# Patient Record
Sex: MALE | Race: WHITE | NOT HISPANIC OR LATINO | URBAN - METROPOLITAN AREA
[De-identification: names, ages, dates, MRNs, and addresses within clinical notes are randomized per-mention and may not be internally consistent; named-entity substitution may affect disease eponyms.]

---

## 2023-03-10 DIAGNOSIS — I10 HYPERTENSION, UNSPECIFIED TYPE: ICD-10-CM

## 2023-03-10 DIAGNOSIS — F98.8 ATTENTION DEFICIT DISORDER, UNSPECIFIED HYPERACTIVITY PRESENCE: ICD-10-CM

## 2023-03-10 RX ORDER — DEXTROAMPHETAMINE SACCHARATE, AMPHETAMINE ASPARTATE, DEXTROAMPHETAMINE SULFATE AND AMPHETAMINE SULFATE 3.75; 3.75; 3.75; 3.75 MG/1; MG/1; MG/1; MG/1
15 TABLET ORAL 2 TIMES DAILY
Qty: 60 TABLET | Refills: 0 | Status: SHIPPED | OUTPATIENT
Start: 2023-03-10 | End: 2023-04-18 | Stop reason: SDUPTHER

## 2023-03-10 RX ORDER — OLMESARTAN MEDOXOMIL 20 MG/1
20 TABLET ORAL DAILY
COMMUNITY
End: 2023-05-23 | Stop reason: SDUPTHER

## 2023-03-10 RX ORDER — DEXTROAMPHETAMINE SACCHARATE, AMPHETAMINE ASPARTATE, DEXTROAMPHETAMINE SULFATE AND AMPHETAMINE SULFATE 3.75; 3.75; 3.75; 3.75 MG/1; MG/1; MG/1; MG/1
15 TABLET ORAL DAILY
COMMUNITY
End: 2023-03-10 | Stop reason: SDUPTHER

## 2023-03-10 NOTE — TELEPHONE ENCOUNTER
Pt wants second and third refill of adderall sent to [facility]   Also needs his bp med sent there as well

## 2023-03-12 RX ORDER — DEXTROAMPHETAMINE SACCHARATE, AMPHETAMINE ASPARTATE, DEXTROAMPHETAMINE SULFATE AND AMPHETAMINE SULFATE 3.75; 3.75; 3.75; 3.75 MG/1; MG/1; MG/1; MG/1
15 TABLET ORAL 2 TIMES DAILY
Qty: 60 TABLET | Refills: 0 | OUTPATIENT
Start: 2023-03-12 | End: 2023-04-11

## 2023-03-12 RX ORDER — OLMESARTAN MEDOXOMIL 20 MG/1
20 TABLET ORAL DAILY
Qty: 60 TABLET | Refills: 0 | OUTPATIENT
Start: 2023-03-12

## 2023-03-12 NOTE — TELEPHONE ENCOUNTER
I already refilled his Adderall just a few days ago    Also, he should have gotten a 90-day prescription of his olmesartan that was sent 1 month ago meaning that he should have 60 more days left    Can you please look into this further before I send this medication and again    This message was originally sent by russell

## 2023-04-18 DIAGNOSIS — F98.8 ATTENTION DEFICIT DISORDER, UNSPECIFIED HYPERACTIVITY PRESENCE: ICD-10-CM

## 2023-04-18 RX ORDER — DEXTROAMPHETAMINE SACCHARATE, AMPHETAMINE ASPARTATE, DEXTROAMPHETAMINE SULFATE AND AMPHETAMINE SULFATE 3.75; 3.75; 3.75; 3.75 MG/1; MG/1; MG/1; MG/1
15 TABLET ORAL 2 TIMES DAILY
Qty: 60 TABLET | Refills: 0 | Status: SHIPPED | OUTPATIENT
Start: 2023-04-18 | End: 2023-05-23 | Stop reason: SDUPTHER

## 2023-05-23 ENCOUNTER — TELEMEDICINE (OUTPATIENT)
Dept: PRIMARY CARE | Facility: CLINIC | Age: 43
End: 2023-05-23
Payer: COMMERCIAL

## 2023-05-23 DIAGNOSIS — F98.8 ATTENTION DEFICIT DISORDER, UNSPECIFIED HYPERACTIVITY PRESENCE: Primary | ICD-10-CM

## 2023-05-23 DIAGNOSIS — R03.0 ELEVATED BLOOD PRESSURE READING: ICD-10-CM

## 2023-05-23 PROBLEM — F41.9 ANXIETY: Status: ACTIVE | Noted: 2023-05-23

## 2023-05-23 PROBLEM — M10.9 ACUTE GOUT INVOLVING TOE: Status: ACTIVE | Noted: 2023-05-23

## 2023-05-23 PROBLEM — E78.5 HYPERLIPIDEMIA: Status: ACTIVE | Noted: 2023-05-23

## 2023-05-23 PROCEDURE — 99214 OFFICE O/P EST MOD 30 MIN: CPT | Performed by: STUDENT IN AN ORGANIZED HEALTH CARE EDUCATION/TRAINING PROGRAM

## 2023-05-23 RX ORDER — DEXTROAMPHETAMINE SACCHARATE, AMPHETAMINE ASPARTATE, DEXTROAMPHETAMINE SULFATE AND AMPHETAMINE SULFATE 3.75; 3.75; 3.75; 3.75 MG/1; MG/1; MG/1; MG/1
15 TABLET ORAL 2 TIMES DAILY
Qty: 60 TABLET | Refills: 0 | Status: SHIPPED | OUTPATIENT
Start: 2023-05-23 | End: 2023-07-03 | Stop reason: SDUPTHER

## 2023-05-23 RX ORDER — OLMESARTAN MEDOXOMIL 20 MG/1
20 TABLET ORAL DAILY
Qty: 90 TABLET | Refills: 0 | Status: SHIPPED | OUTPATIENT
Start: 2023-05-23 | End: 2023-07-03 | Stop reason: SDUPTHER

## 2023-05-23 NOTE — PROGRESS NOTES
Subjective   Patient ID: Iva Harris is a 42 y.o. male who presents for ADD.  Today he is accompanied by alone.     HPI  I have personally reviewed the OARRS report for IVA HARRIS. I have considered the risks of abuse, dependence, addiction and diversion.   Is the patient prescribed a combination of a benzodiazepine and opioid? No.   Last urine drug screening date/ordered today: 02/02/2023   Date of the last Controlled Substance Agreement: 02/02/2023      Patient is presenting today by a virtual visit doing well overall.     1. ADD  Continues to take Adderall 15 mg twice daily as indicated the chart without any issues/complaints  Currently satisfied with regimen.  Asking for refills to be sent to his local pharmacy which is now down in [place], [place]  Denies any side effect from medication     2. Elevated blood pressure reading  Continues to take olmesartan 20mg as of last visit.  Blood pressure was elevated at 154/88 as of last visit.  Denies any cardiopulmonary symptoms  States that his blood pressures are better controlled at home and he feels well      Current Outpatient Medications on File Prior to Visit   Medication Sig Dispense Refill    amphetamine-dextroamphetamine (Adderall) 15 mg tablet Take 1 tablet (15 mg) by mouth in the morning and 1 tablet (15 mg) before bedtime. 60 tablet 0    olmesartan (BENIcar) 20 mg tablet Take 1 tablet (20 mg) by mouth once daily.       No current facility-administered medications on file prior to visit.        No Known Allergies    Immunization History   Administered Date(s) Administered    Tdap 06/08/2021         Review of Systems  All pertinent positive symptoms are included in the history of present illness.  All other systems have been reviewed and are negative and noncontributory to this patient's current ailments.     Objective   There were no vitals taken for this visit.  BSA: There is no height or weight on file to calculate BSA.  No visits with results within 1  Month(s) from this visit.   Latest known visit with results is:   Legacy Encounter on 02/02/2023   Component Date Value Ref Range Status    DRUG SCREEN COMMENT URINE 02/02/2023 SEE BELOW   Final    Comment: Drug screen results are presumptive and should not be used to assess   compliance with prescribed medication. Definitive confirmatory drug testing   has been added to this sample for any positive screen result and will be   reported separately.   .  Toxicology screening results are reported qualitatively. The concentration   must be greater than or equal to the cutoff to be reported as positive. The   concentration at which the screening test can detect an individual drug or   metabolite varies. The absence of expected drug(s) and/or drug metabolite(s)   may indicate non-compliance, inappropriate timing of specimen collection   relative to drug administration, poor drug absorption, diluted/adulterated   urine, or limitations of testing. For medical purposes only; not valid for   forensic use.   .  Interpretive questions should be directed to the laboratory medical   directors.        Amphetamine Screen, Urine 02/02/2023 PRESUMPTIVE POSITIVE (A)  NEGATIVE Final    Comment:  CUTOFF LEVEL:  500 NG/ML   Cross-reactivity has been reported with high concentrations   of the following drugs: buproprion, chloroquine, chlorpromazine,   ephedrine, mephentermine, fenfluramine, phentermine,   phenylpropanolamine, pseudoephedrine, and propranolol.      Barbiturate Screen, Urine 02/02/2023 PRESUMPTIVE NEGATIVE  NEGATIVE Final     CUTOFF LEVEL: 200 NG/ML    BENZODIAZEPINE (PRESENCE) IN URINE* 02/02/2023 PRESUMPTIVE NEGATIVE  NEGATIVE Final     CUTOFF LEVEL: 200 NG/ML    Cannabinoid Screen, Urine 02/02/2023 PRESUMPTIVE NEGATIVE  NEGATIVE Final     CUTOFF LEVEL: 50 NG/ML    Cocaine Screen, Urine 02/02/2023 PRESUMPTIVE NEGATIVE  NEGATIVE Final     CUTOFF LEVEL: 150 NG/ML    Fentanyl, Ur 02/02/2023 PRESUMPTIVE NEGATIVE  NEGATIVE  Final      CUTOFF LEVEL:  5 NG/ML    Methadone Screen, Urine 02/02/2023 PRESUMPTIVE NEGATIVE  NEGATIVE Final    Comment:  CUTOFF LEVEL: 150 NG/ML   The metabolite L-alpha-acetylmethadol (LAAM) is not   detected by this method in concentrations that would   be found in the urine of patients on LAAM therapy.      Opiate Screen, Urine 02/02/2023 PRESUMPTIVE NEGATIVE  NEGATIVE Final    Comment:  CUTOFF LEVEL: 300 NG/ML   The opiate screen does not detect fentanyl, meperidine, or    tramadol. Oxycodone is not consistently detected (refer to   Oxycodone Screen, Urine result).      Oxycodone Screen, Ur 02/02/2023 PRESUMPTIVE NEGATIVE  NEGATIVE Final    Comment:  CUTOFF LEVEL: 100 NG/ML    This test will accurately detect both oxycodone and oxymorphone.           PCP Screen, Urine 02/02/2023 PRESUMPTIVE NEGATIVE  NEGATIVE Final    Comment:  CUTOFF LEVEL:  25 NG/ML   Cross-reactivity has been reported with dextromethorphan.      Amphetamines,Urine 02/02/2023 1,035  ng/mL Final    Comment: Consistent with use of a drug containing amphetamine. May also   reflect metabolism of methamphetamine, when methamphetamine is   present.  Amphetamine and methamphetamine exist in d- and   l-isomeric forms.  These forms are not distinguished by this test.    Isomeric separation is available separately for an additional   charge.  INTERPRETIVE INFORMATION: Amphetamines, Urine,                             Quantitative  Methodology: Quantitative Liquid Chromatography-Tandem Mass   Spectrometry  Positive cutoff: 200 ng/mL unless specified below:  Amphetamine     50 ng/mL  For medical purposes only; not valid for forensic use.   The absence of expected drug(s) and/or drug metabolite(s) may   indicate non-compliance, inappropriate timing of specimen   collection relative to drug administration, poor drug absorption,   diluted/adulterated urine, or limitations of testing. The   concentration value must be greater than or equal to the cutoff to    be reported as positive. Interpretive question                           s should be directed   to the laboratory.  This test was developed and its performance characteristics   determined by [facility]. It has not been cleared or   approved by the US Food and Drug Administration. This test was   performed in a CLIA certified laboratory and is intended for   clinical purposes.      MDA, Urine 02/02/2023 <200  ng/mL Final    MDEA, Urine 02/02/2023 <200  ng/mL Final    MDMA, Urine 02/02/2023 <200  ng/mL Final    Methamphetamine Quant, Ur 02/02/2023 <200  ng/mL Final    Phentermine,Urine 02/02/2023 <200  ng/mL Final    Comment: Performed By: [facility]  [address] [place] [address]  : Jonathan Ma MD, PhD         Physical Exam  CONSTITUTIONAL - well nourished, well developed, looks like stated age, in no acute distress, not ill-appearing, and not tired appearing  SKIN - normal skin color and pigmentation, normal skin turgor without rash, lesions, or nodules visualized  HEAD - no trauma, normocephalic  EYES - normal external exam  NECK - supple without rigidity, no neck mass was observed, no thyromegaly or thyroid nodules  CHEST - clear to auscultation, no wheezing, no crackles and no rales, good effort  EXTREMITIES - no edema, no deformities  PSYCHIATRIC - alert, pleasant and cordial, age-appropriate  IMMUNOLOGIC - no cervical lymphadenopathy     Assessment/Plan   1. ADD  Stable. No changes recommended at this time  We will continue your Adderall 15 mg twice daily as currently prescribed  We are only able to provide 1 month refill due to this being virtual  I would recommend that you come back within the next month to get a 3-month prescription     2. Elevated blood pressure reading  Refilled olmesartan 20 mg daily  I would like to have you monitor and record blood pressures at home   Blood pressure goal should be below 130/80, ideally 120/80      Please follow-up  within the next month for yearly physical examination as well as further refills

## 2023-07-03 ENCOUNTER — LAB (OUTPATIENT)
Dept: LAB | Facility: LAB | Age: 43
End: 2023-07-03
Payer: COMMERCIAL

## 2023-07-03 ENCOUNTER — OFFICE VISIT (OUTPATIENT)
Dept: PRIMARY CARE | Facility: CLINIC | Age: 43
End: 2023-07-03
Payer: COMMERCIAL

## 2023-07-03 VITALS
HEIGHT: 72 IN | WEIGHT: 197 LBS | DIASTOLIC BLOOD PRESSURE: 100 MMHG | HEART RATE: 97 BPM | SYSTOLIC BLOOD PRESSURE: 150 MMHG | BODY MASS INDEX: 26.68 KG/M2

## 2023-07-03 DIAGNOSIS — R03.0 ELEVATED BLOOD PRESSURE READING: ICD-10-CM

## 2023-07-03 DIAGNOSIS — Z00.00 HEALTHCARE MAINTENANCE: Primary | ICD-10-CM

## 2023-07-03 DIAGNOSIS — H61.22 IMPACTED CERUMEN OF LEFT EAR: ICD-10-CM

## 2023-07-03 DIAGNOSIS — F98.8 ATTENTION DEFICIT DISORDER, UNSPECIFIED HYPERACTIVITY PRESENCE: ICD-10-CM

## 2023-07-03 LAB
ALANINE AMINOTRANSFERASE (SGPT) (U/L) IN SER/PLAS: 31 U/L (ref 10–52)
ALBUMIN (G/DL) IN SER/PLAS: 4.2 G/DL (ref 3.4–5)
ALKALINE PHOSPHATASE (U/L) IN SER/PLAS: 63 U/L (ref 33–120)
ANION GAP IN SER/PLAS: 13 MMOL/L (ref 10–20)
ASPARTATE AMINOTRANSFERASE (SGOT) (U/L) IN SER/PLAS: 21 U/L (ref 9–39)
BASOPHILS (10*3/UL) IN BLOOD BY AUTOMATED COUNT: 0.03 X10E9/L (ref 0–0.1)
BASOPHILS/100 LEUKOCYTES IN BLOOD BY AUTOMATED COUNT: 0.7 % (ref 0–2)
BILIRUBIN TOTAL (MG/DL) IN SER/PLAS: 0.9 MG/DL (ref 0–1.2)
CALCIUM (MG/DL) IN SER/PLAS: 9.5 MG/DL (ref 8.6–10.3)
CARBON DIOXIDE, TOTAL (MMOL/L) IN SER/PLAS: 27 MMOL/L (ref 21–32)
CHLORIDE (MMOL/L) IN SER/PLAS: 102 MMOL/L (ref 98–107)
CHOLESTEROL (MG/DL) IN SER/PLAS: 233 MG/DL (ref 0–199)
CHOLESTEROL IN HDL (MG/DL) IN SER/PLAS: 102.1 MG/DL
CHOLESTEROL/HDL RATIO: 2.3
CREATININE (MG/DL) IN SER/PLAS: 0.92 MG/DL (ref 0.5–1.3)
EOSINOPHILS (10*3/UL) IN BLOOD BY AUTOMATED COUNT: 0.04 X10E9/L (ref 0–0.7)
EOSINOPHILS/100 LEUKOCYTES IN BLOOD BY AUTOMATED COUNT: 0.9 % (ref 0–6)
ERYTHROCYTE DISTRIBUTION WIDTH (RATIO) BY AUTOMATED COUNT: 14.2 % (ref 11.5–14.5)
ERYTHROCYTE MEAN CORPUSCULAR HEMOGLOBIN CONCENTRATION (G/DL) BY AUTOMATED: 32.6 G/DL (ref 32–36)
ERYTHROCYTE MEAN CORPUSCULAR VOLUME (FL) BY AUTOMATED COUNT: 94 FL (ref 80–100)
ERYTHROCYTES (10*6/UL) IN BLOOD BY AUTOMATED COUNT: 5.17 X10E12/L (ref 4.5–5.9)
ESTIMATED AVERAGE GLUCOSE FOR HBA1C: 123 MG/DL
GFR MALE: >90 ML/MIN/1.73M2
GLUCOSE (MG/DL) IN SER/PLAS: 133 MG/DL (ref 74–99)
HEMATOCRIT (%) IN BLOOD BY AUTOMATED COUNT: 48.8 % (ref 41–52)
HEMOGLOBIN (G/DL) IN BLOOD: 15.9 G/DL (ref 13.5–17.5)
HEMOGLOBIN A1C/HEMOGLOBIN TOTAL IN BLOOD: 5.9 %
IMMATURE GRANULOCYTES/100 LEUKOCYTES IN BLOOD BY AUTOMATED COUNT: 0.2 % (ref 0–0.9)
LDL: 117 MG/DL (ref 0–99)
LEUKOCYTES (10*3/UL) IN BLOOD BY AUTOMATED COUNT: 4.6 X10E9/L (ref 4.4–11.3)
LYMPHOCYTES (10*3/UL) IN BLOOD BY AUTOMATED COUNT: 0.9 X10E9/L (ref 1.2–4.8)
LYMPHOCYTES/100 LEUKOCYTES IN BLOOD BY AUTOMATED COUNT: 19.7 % (ref 13–44)
MONOCYTES (10*3/UL) IN BLOOD BY AUTOMATED COUNT: 0.5 X10E9/L (ref 0.1–1)
MONOCYTES/100 LEUKOCYTES IN BLOOD BY AUTOMATED COUNT: 10.9 % (ref 2–10)
NEUTROPHILS (10*3/UL) IN BLOOD BY AUTOMATED COUNT: 3.09 X10E9/L (ref 1.2–7.7)
NEUTROPHILS/100 LEUKOCYTES IN BLOOD BY AUTOMATED COUNT: 67.6 % (ref 40–80)
PLATELETS (10*3/UL) IN BLOOD AUTOMATED COUNT: 205 X10E9/L (ref 150–450)
POC APPEARANCE, URINE: CLEAR
POC BILIRUBIN, URINE: NEGATIVE
POC BLOOD, URINE: NEGATIVE
POC COLOR, URINE: ABNORMAL
POC GLUCOSE, URINE: NEGATIVE MG/DL
POC KETONES, URINE: NEGATIVE MG/DL
POC LEUKOCYTES, URINE: NEGATIVE
POC NITRITE,URINE: NEGATIVE
POC PH, URINE: 6.5 PH
POC PROTEIN, URINE: NEGATIVE MG/DL
POC SPECIFIC GRAVITY, URINE: 1.01
POC UROBILINOGEN, URINE: 0.2 EU/DL
POTASSIUM (MMOL/L) IN SER/PLAS: 3.7 MMOL/L (ref 3.5–5.3)
PROSTATE SPECIFIC AG (NG/ML) IN SER/PLAS: 2.43 NG/ML (ref 0–4)
PROTEIN TOTAL: 7.2 G/DL (ref 6.4–8.2)
SODIUM (MMOL/L) IN SER/PLAS: 138 MMOL/L (ref 136–145)
THYROTROPIN (MIU/L) IN SER/PLAS BY DETECTION LIMIT <= 0.05 MIU/L: 1.8 MIU/L (ref 0.44–3.98)
TRIGLYCERIDE (MG/DL) IN SER/PLAS: 68 MG/DL (ref 0–149)
UREA NITROGEN (MG/DL) IN SER/PLAS: 14 MG/DL (ref 6–23)
VLDL: 14 MG/DL (ref 0–40)

## 2023-07-03 PROCEDURE — 85025 COMPLETE CBC W/AUTO DIFF WBC: CPT

## 2023-07-03 PROCEDURE — 81002 URINALYSIS NONAUTO W/O SCOPE: CPT | Performed by: STUDENT IN AN ORGANIZED HEALTH CARE EDUCATION/TRAINING PROGRAM

## 2023-07-03 PROCEDURE — 80053 COMPREHEN METABOLIC PANEL: CPT

## 2023-07-03 PROCEDURE — 84443 ASSAY THYROID STIM HORMONE: CPT

## 2023-07-03 PROCEDURE — 93000 ELECTROCARDIOGRAM COMPLETE: CPT | Performed by: STUDENT IN AN ORGANIZED HEALTH CARE EDUCATION/TRAINING PROGRAM

## 2023-07-03 PROCEDURE — 84153 ASSAY OF PSA TOTAL: CPT

## 2023-07-03 PROCEDURE — 99214 OFFICE O/P EST MOD 30 MIN: CPT | Performed by: STUDENT IN AN ORGANIZED HEALTH CARE EDUCATION/TRAINING PROGRAM

## 2023-07-03 PROCEDURE — 36415 COLL VENOUS BLD VENIPUNCTURE: CPT

## 2023-07-03 PROCEDURE — 83036 HEMOGLOBIN GLYCOSYLATED A1C: CPT

## 2023-07-03 PROCEDURE — 99396 PREV VISIT EST AGE 40-64: CPT | Performed by: STUDENT IN AN ORGANIZED HEALTH CARE EDUCATION/TRAINING PROGRAM

## 2023-07-03 PROCEDURE — 80061 LIPID PANEL: CPT

## 2023-07-03 RX ORDER — DEXTROAMPHETAMINE SACCHARATE, AMPHETAMINE ASPARTATE, DEXTROAMPHETAMINE SULFATE AND AMPHETAMINE SULFATE 3.75; 3.75; 3.75; 3.75 MG/1; MG/1; MG/1; MG/1
15 TABLET ORAL 2 TIMES DAILY
Qty: 60 TABLET | Refills: 0 | Status: SHIPPED | OUTPATIENT
Start: 2023-09-01 | End: 2023-11-03 | Stop reason: SDUPTHER

## 2023-07-03 RX ORDER — OLMESARTAN MEDOXOMIL 40 MG/1
40 TABLET ORAL DAILY
Qty: 90 TABLET | Refills: 1 | Status: SHIPPED | OUTPATIENT
Start: 2023-07-03 | End: 2023-11-03 | Stop reason: SDUPTHER

## 2023-07-03 RX ORDER — DEXTROAMPHETAMINE SACCHARATE, AMPHETAMINE ASPARTATE, DEXTROAMPHETAMINE SULFATE AND AMPHETAMINE SULFATE 3.75; 3.75; 3.75; 3.75 MG/1; MG/1; MG/1; MG/1
15 TABLET ORAL 2 TIMES DAILY
Qty: 60 TABLET | Refills: 0 | Status: SHIPPED | OUTPATIENT
Start: 2023-07-03 | End: 2023-10-05 | Stop reason: SDUPTHER

## 2023-07-03 RX ORDER — DEXTROAMPHETAMINE SACCHARATE, AMPHETAMINE ASPARTATE, DEXTROAMPHETAMINE SULFATE AND AMPHETAMINE SULFATE 3.75; 3.75; 3.75; 3.75 MG/1; MG/1; MG/1; MG/1
15 TABLET ORAL 2 TIMES DAILY
Qty: 60 TABLET | Refills: 0 | Status: SHIPPED | OUTPATIENT
Start: 2023-08-02 | End: 2023-11-03 | Stop reason: SDUPTHER

## 2023-07-03 NOTE — PROGRESS NOTES
Subjective   Patient ID: Esequiel Gonzalez is a 43 y.o. male who presents for Annual Exam.  Today he is accompanied by alone.     HPI  1.HM  Overall patient is doing well.   Immunization: Tdap UTD 2021  Colon Cancer Screening: No family history  Diet: clean eating  Exercise: 12k steps a day. Stopped lifting for a few months   Tobacco: chews  EtOH: Rarely Socially - homemade wine    2. ADHD  Continues to take Adderall 15 mg twice daily as needed in the chart  UDS/CSA up-to-date from February 2023  Requests 3mo refill  Denies side effects    3. Hypertension  High today.   States stress 2/2 divorce.   Has been taking medication without any side effects.  Denies any cardiopulmonary symptoms  Requesting and or even willing to increase medication if needed as he admits that he is not fully compliant      Current Outpatient Medications on File Prior to Visit   Medication Sig Dispense Refill    [DISCONTINUED] amphetamine-dextroamphetamine (Adderall) 15 mg tablet Take 1 tablet (15 mg) by mouth 2 times a day. 60 tablet 0    [DISCONTINUED] olmesartan (BENIcar) 20 mg tablet Take 1 tablet (20 mg) by mouth once daily. 90 tablet 0     No current facility-administered medications on file prior to visit.        No Known Allergies    Immunization History   Administered Date(s) Administered    Tdap 05/02/2014, 06/08/2021         Review of Systems  All pertinent positive symptoms are included in the history of present illness.  All other systems have been reviewed and are negative and noncontributory to this patient's current ailments.     Objective   BP (!) 150/100 (BP Location: Left arm, Patient Position: Lying, BP Cuff Size: Adult)   Pulse 97   Ht 1.829 m (6')   Wt 89.4 kg (197 lb)   BMI 26.72 kg/m²   BSA: 2.13 meters squared  No visits with results within 1 Month(s) from this visit.   Latest known visit with results is:   Legacy Encounter on 02/02/2023   Component Date Value Ref Range Status    DRUG SCREEN COMMENT URINE 02/02/2023  SEE BELOW   Final    Comment: Drug screen results are presumptive and should not be used to assess   compliance with prescribed medication. Definitive confirmatory drug testing   has been added to this sample for any positive screen result and will be   reported separately.   .  Toxicology screening results are reported qualitatively. The concentration   must be greater than or equal to the cutoff to be reported as positive. The   concentration at which the screening test can detect an individual drug or   metabolite varies. The absence of expected drug(s) and/or drug metabolite(s)   may indicate non-compliance, inappropriate timing of specimen collection   relative to drug administration, poor drug absorption, diluted/adulterated   urine, or limitations of testing. For medical purposes only; not valid for   forensic use.   .  Interpretive questions should be directed to the laboratory medical   directors.        Amphetamine Screen, Urine 02/02/2023 PRESUMPTIVE POSITIVE (A)  NEGATIVE Final    Comment:  CUTOFF LEVEL:  500 NG/ML   Cross-reactivity has been reported with high concentrations   of the following drugs: buproprion, chloroquine, chlorpromazine,   ephedrine, mephentermine, fenfluramine, phentermine,   phenylpropanolamine, pseudoephedrine, and propranolol.      Barbiturate Screen, Urine 02/02/2023 PRESUMPTIVE NEGATIVE  NEGATIVE Final     CUTOFF LEVEL: 200 NG/ML    BENZODIAZEPINE (PRESENCE) IN URINE* 02/02/2023 PRESUMPTIVE NEGATIVE  NEGATIVE Final     CUTOFF LEVEL: 200 NG/ML    Cannabinoid Screen, Urine 02/02/2023 PRESUMPTIVE NEGATIVE  NEGATIVE Final     CUTOFF LEVEL: 50 NG/ML    Cocaine Screen, Urine 02/02/2023 PRESUMPTIVE NEGATIVE  NEGATIVE Final     CUTOFF LEVEL: 150 NG/ML    Fentanyl, Ur 02/02/2023 PRESUMPTIVE NEGATIVE  NEGATIVE Final      CUTOFF LEVEL:  5 NG/ML    Methadone Screen, Urine 02/02/2023 PRESUMPTIVE NEGATIVE  NEGATIVE Final    Comment:  CUTOFF LEVEL: 150 NG/ML   The metabolite  L-alpha-acetylmethadol (LAAM) is not   detected by this method in concentrations that would   be found in the urine of patients on LAAM therapy.      Opiate Screen, Urine 02/02/2023 PRESUMPTIVE NEGATIVE  NEGATIVE Final    Comment:  CUTOFF LEVEL: 300 NG/ML   The opiate screen does not detect fentanyl, meperidine, or    tramadol. Oxycodone is not consistently detected (refer to   Oxycodone Screen, Urine result).      Oxycodone Screen, Ur 02/02/2023 PRESUMPTIVE NEGATIVE  NEGATIVE Final    Comment:  CUTOFF LEVEL: 100 NG/ML    This test will accurately detect both oxycodone and oxymorphone.           PCP Screen, Urine 02/02/2023 PRESUMPTIVE NEGATIVE  NEGATIVE Final    Comment:  CUTOFF LEVEL:  25 NG/ML   Cross-reactivity has been reported with dextromethorphan.      Amphetamines,Urine 02/02/2023 1,035  ng/mL Final    Comment: Consistent with use of a drug containing amphetamine. May also   reflect metabolism of methamphetamine, when methamphetamine is   present.  Amphetamine and methamphetamine exist in d- and   l-isomeric forms.  These forms are not distinguished by this test.    Isomeric separation is available separately for an additional   charge.  INTERPRETIVE INFORMATION: Amphetamines, Urine,                             Quantitative  Methodology: Quantitative Liquid Chromatography-Tandem Mass   Spectrometry  Positive cutoff: 200 ng/mL unless specified below:  Amphetamine     50 ng/mL  For medical purposes only; not valid for forensic use.   The absence of expected drug(s) and/or drug metabolite(s) may   indicate non-compliance, inappropriate timing of specimen   collection relative to drug administration, poor drug absorption,   diluted/adulterated urine, or limitations of testing. The   concentration value must be greater than or equal to the cutoff to   be reported as positive. Interpretive question                           s should be directed   to the laboratory.  This test was developed and its performance  characteristics   determined by [facility]. It has not been cleared or   approved by the US Food and Drug Administration. This test was   performed in a CLIA certified laboratory and is intended for   clinical purposes.      MDA, Urine 02/02/2023 <200  ng/mL Final    MDEA, Urine 02/02/2023 <200  ng/mL Final    MDMA, Urine 02/02/2023 <200  ng/mL Final    Methamphetamine Quant, Ur 02/02/2023 <200  ng/mL Final    Phentermine,Urine 02/02/2023 <200  ng/mL Final    Comment: Performed By: [facility]  [address] [place] [id number]  : Jonathan Ma MD, PhD         Physical Exam  CONSTITUTIONAL - well nourished, well developed, looks like stated age, in no acute distress, not ill-appearing, and not tired appearing  SKIN - normal skin color and pigmentation, normal skin turgor without rash, lesions, or nodules visualized  HEAD - no trauma, normocephalic  EYES - pupils are equal and reactive to light, extraocular muscles are intact, and normal external exam  ENT - R TM intact, left ear total cerumen bloackage, no injection, no signs of infection, uvula midline, normal tongue movement and throat normal, no exudate, nasal passage without discharge and patent  NECK - supple without rigidity, no neck mass was observed, no thyromegaly or thyroid nodules  CHEST - clear to auscultation, no wheezing, no crackles and no rales, good effort  CARDIAC -sinus tachycardia no skipped beats, no murmur  ABDOMEN - no organomegaly, soft, nontender, nondistended, normal bowel sounds, no guarding/rebound/rigidity  EXTREMITIES - no edema, no deformities  NEUROLOGICAL - normal gait, normal balance, normal motor, no ataxia  PSYCHIATRIC - alert, pleasant and cordial, age-appropriate  IMMUNOLOGIC - no cervical lymphadenopathy     Assessment/Plan   1. HM  Complete history and physical examination was performed  EKG reveals sinus tachycardia without any acute changes   -Computer did show atrial flutter but  I disagree with this read as there are distinct P waves  We will notify of test results once available and make treatment recommendations accordingly    2. ADHD  UDS/CSA up-to-date from 2023, February  Refills provided  Continue Adderall 15 mg twice daily    3. Hypertension  Increased olmesartan to 40mg  I would like to have you monitor and record blood pressures at home  Blood pressure goal should be below 130/80, ideally 120/80    4. Blocked L ear  Recommended Debrox and FU if unresolved.

## 2023-07-05 ENCOUNTER — TELEPHONE (OUTPATIENT)
Dept: PRIMARY CARE | Facility: CLINIC | Age: 43
End: 2023-07-05
Payer: COMMERCIAL

## 2023-07-05 NOTE — TELEPHONE ENCOUNTER
----- Message from Colton Zhao DO sent at 7/5/2023  6:20 AM EDT -----  Cholesterol noted to be elevated at 233, , , triglycerides 68    Sugar slightly elevated 133 but this was expected due to him not being fasting    Otherwise liver, kidneys, electrodes are all within normal limits    Hemoglobin A1c is slightly elevated at 5.9% which points towards some prediabetes so we will have to continue monitoring closely    Complete blood cell count shows no anemia    Thyroid and prostate within normal limits

## 2023-07-05 NOTE — RESULT ENCOUNTER NOTE
Cholesterol noted to be elevated at 233, , , triglycerides 68    Sugar slightly elevated 133 but this was expected due to him not being fasting    Otherwise liver, kidneys, electrodes are all within normal limits    Hemoglobin A1c is slightly elevated at 5.9% which points towards some prediabetes so we will have to continue monitoring closely    Complete blood cell count shows no anemia    Thyroid and prostate within normal limits

## 2023-10-05 ENCOUNTER — TELEMEDICINE (OUTPATIENT)
Dept: PRIMARY CARE | Facility: CLINIC | Age: 43
End: 2023-10-05
Payer: COMMERCIAL

## 2023-10-05 DIAGNOSIS — F98.8 ATTENTION DEFICIT DISORDER, UNSPECIFIED HYPERACTIVITY PRESENCE: ICD-10-CM

## 2023-10-05 PROCEDURE — 99213 OFFICE O/P EST LOW 20 MIN: CPT | Performed by: STUDENT IN AN ORGANIZED HEALTH CARE EDUCATION/TRAINING PROGRAM

## 2023-10-05 RX ORDER — DEXTROAMPHETAMINE SACCHARATE, AMPHETAMINE ASPARTATE, DEXTROAMPHETAMINE SULFATE AND AMPHETAMINE SULFATE 3.75; 3.75; 3.75; 3.75 MG/1; MG/1; MG/1; MG/1
15 TABLET ORAL 2 TIMES DAILY
Qty: 60 TABLET | Refills: 0 | Status: SHIPPED | OUTPATIENT
Start: 2023-10-05 | End: 2023-11-03 | Stop reason: SDUPTHER

## 2023-10-05 NOTE — PROGRESS NOTES
Subjective   Patient ID: Esequiel Gonzalez is a 43 y.o. male who presents for No chief complaint on file..  Today he is accompanied by alone.     HPI  1.ADHD  Continues to take Adderall 15 mg twice daily as needed in the chart  UDS/CSA up-to-date from February 2023  Asking for a 1 month prescription due to this being a virtual visit  Denies side effects    2.  Hypertension  Recently was added and changed to olmesartan increased to 40 mg  States that his blood pressure has been much better  Does not need refills at this time      Current Outpatient Medications on File Prior to Visit   Medication Sig Dispense Refill    amphetamine-dextroamphetamine (Adderall) 15 mg tablet Take 1 tablet (15 mg) by mouth 2 times a day. 60 tablet 0    amphetamine-dextroamphetamine (Adderall) 15 mg tablet Take 1 tablet (15 mg) by mouth 2 times a day. Do not start before August 2, 2023. 60 tablet 0    amphetamine-dextroamphetamine (Adderall) 15 mg tablet Take 1 tablet (15 mg) by mouth 2 times a day. Do not start before September 1, 2023. 60 tablet 0    olmesartan (BENIcar) 40 mg tablet Take 1 tablet (40 mg) by mouth once daily. 90 tablet 1     No current facility-administered medications on file prior to visit.        No Known Allergies    Immunization History   Administered Date(s) Administered    Tdap vaccine, age 7 year and older (BOOSTRIX) 05/02/2014, 06/08/2021         Review of Systems  All pertinent positive symptoms are included in the history of present illness.  All other systems have been reviewed and are negative and noncontributory to this patient's current ailments.     Objective   There were no vitals taken for this visit.  BSA: There is no height or weight on file to calculate BSA.  No visits with results within 1 Month(s) from this visit.   Latest known visit with results is:   Lab on 07/03/2023   Component Date Value Ref Range Status    PSA 07/03/2023 2.43  0.00 - 4.00 ng/mL Final    The FDA requires that the method used for  PSA assay be   reported to the physician. Values obtained with different   assay methods must not be used interchangeably. This test  was performed at [facility] using the Access   [employer] PSA assay is a two-site immunoenzymatic sandwich   assay. The assay is approved for measurement of   prostate-specific antigen (PSA)in serum and may be used   in conjunction with a digital rectal examination in men   50 years and older as an aid in detection of prostate   cancer.  5-Alpha-reductase inhibitors (e.g. Proscar, Finasteride,   Avodart, Dutasteride and Linda) for the treatment of BPH   have been shown to lower PSA levels by an average of 50%   after 6 months of treatment.    TSH 07/03/2023 1.80  0.44 - 3.98 mIU/L Final     TSH testing is performed using different testing    methodology at [facility] than at other    [facility]. Direct result comparisons should    only be made within the same method.    Cholesterol 07/03/2023 233 (H)  0 - 199 mg/dL Final    .      AGE      DESIRABLE   BORDERLINE HIGH   HIGH     0-19 Y     0 - 169       170 - 199     >/= 200    20-24 Y     0 - 189       190 - 224     >/= 225         >24 Y     0 - 199       200 - 239     >/= 240   **All ranges are based on fasting samples. Specific   therapeutic targets will vary based on patient-specific   cardiac risk.  .   Pediatric guidelines reference:Pediatrics 2011, 128(S5).   Adult guidelines reference: NCEP ATPIII Guidelines,     [place] 2001, 258:2486-97  .   Venipuncture immediately after or during the    administration of Metamizole may lead to falsely   low results. Testing should be performed immediately   prior to Metamizole dosing.    HDL 07/03/2023 102.1  mg/dL Final    .      AGE      VERY LOW   LOW     NORMAL    HIGH       0-19 Y       < 35   < 40     40-45     ----    20-24 Y       ----   < 40       >45     ----      >24 Y       ----   < 40     40-60      >60  .    Cholesterol/HDL Ratio 07/03/2023 2.3    Final    REF VALUES  DESIRABLE  < 3.4  HIGH RISK  > 5.0    LDL 07/03/2023 117 (H)  0 - 99 mg/dL Final    .                           NEAR      BORD      AGE      DESIRABLE  OPTIMAL    HIGH     HIGH     VERY HIGH     0-19 Y     0 - 109     ---    110-129   >/= 130     ----    20-24 Y     0 - 119     ---    120-159   >/= 160     ----      >24 Y     0 -  99   100-129  130-159   160-189     >/=190  .    VLDL 07/03/2023 14  0 - 40 mg/dL Final    Triglycerides 07/03/2023 68  0 - 149 mg/dL Final    .      AGE      DESIRABLE   BORDERLINE HIGH   HIGH     VERY HIGH   0 D-90 D    19 - 174         ----         ----        ----  91 D- 9 Y     0 -  74        75 -  99     >/= 100      ----    10-19 Y     0 -  89        90 - 129     >/= 130      ----    20-24 Y     0 - 114       115 - 149     >/= 150      ----         >24 Y     0 - 149       150 - 199    200- 499    >/= 500  .   Venipuncture immediately after or during the    administration of Metamizole may lead to falsely   low results. Testing should be performed immediately   prior to Metamizole dosing.    Glucose 07/03/2023 133 (H)  74 - 99 mg/dL Final    Sodium 07/03/2023 138  136 - 145 mmol/L Final    Potassium 07/03/2023 3.7  3.5 - 5.3 mmol/L Final    Chloride 07/03/2023 102  98 - 107 mmol/L Final    Bicarbonate 07/03/2023 27  21 - 32 mmol/L Final    Anion Gap 07/03/2023 13  10 - 20 mmol/L Final    Urea Nitrogen 07/03/2023 14  6 - 23 mg/dL Final    Creatinine 07/03/2023 0.92  0.50 - 1.30 mg/dL Final    GFR MALE 07/03/2023 >90  >90 mL/min/1.73m2 Final     CALCULATIONS OF ESTIMATED GFR ARE PERFORMED   USING THE 2021 CKD-EPI STUDY REFIT EQUATION   WITHOUT THE RACE VARIABLE FOR THE IDMS-TRACEABLE   CREATININE METHODS.    https://jasn.asnjournals.org/content/early/2021/09/22/ASN.1302098789    Calcium 07/03/2023 9.5  8.6 - 10.3 mg/dL Final    Albumin 07/03/2023 4.2  3.4 - 5.0 g/dL Final    Alkaline Phosphatase 07/03/2023 63  33 - 120 U/L Final    Total Protein 07/03/2023 7.2  6.4  - 8.2 g/dL Final    AST 07/03/2023 21  9 - 39 U/L Final    Total Bilirubin 07/03/2023 0.9  0.0 - 1.2 mg/dL Final    ALT (SGPT) 07/03/2023 31  10 - 52 U/L Final     Patients treated with Sulfasalazine may generate    falsely decreased results for ALT.    WBC 07/03/2023 4.6  4.4 - 11.3 x10E9/L Final    RBC 07/03/2023 5.17  4.50 - 5.90 x10E12/L Final    Hemoglobin 07/03/2023 15.9  13.5 - 17.5 g/dL Final    Hematocrit 07/03/2023 48.8  41.0 - 52.0 % Final    MCV 07/03/2023 94  80 - 100 fL Final    MCHC 07/03/2023 32.6  32.0 - 36.0 g/dL Final    Platelets 07/03/2023 205  150 - 450 x10E9/L Final    RDW 07/03/2023 14.2  11.5 - 14.5 % Final    Neutrophils % 07/03/2023 67.6  40.0 - 80.0 % Final    Immature Granulocytes %, Automated 07/03/2023 0.2  0.0 - 0.9 % Final     Immature Granulocyte Count (IG) includes promyelocytes,    myelocytes and metamyelocytes but does not include bands.   Percent differential counts (%) should be interpreted in the   context of the absolute cell counts (cells/L).    Lymphocytes % 07/03/2023 19.7  13.0 - 44.0 % Final    Monocytes % 07/03/2023 10.9  2.0 - 10.0 % Final    Eosinophils % 07/03/2023 0.9  0.0 - 6.0 % Final    Basophils % 07/03/2023 0.7  0.0 - 2.0 % Final    Neutrophils Absolute 07/03/2023 3.09  1.20 - 7.70 x10E9/L Final    Lymphocytes Absolute 07/03/2023 0.90 (L)  1.20 - 4.80 x10E9/L Final    Monocytes Absolute 07/03/2023 0.50  0.10 - 1.00 x10E9/L Final    Eosinophils Absolute 07/03/2023 0.04  0.00 - 0.70 x10E9/L Final    Basophils Absolute 07/03/2023 0.03  0.00 - 0.10 x10E9/L Final    Hemoglobin A1C 07/03/2023 5.9 (A)  % Final         Diagnosis of Diabetes-Adults   Non-Diabetic: < or = 5.6%   Increased risk for developing diabetes: 5.7-6.4%   Diagnostic of diabetes: > or = 6.5%  .       Monitoring of Diabetes                Age (y)     Therapeutic Goal (%)   Adults:          >18           <7.0   Pediatrics:    13-18           <7.5                   7-12           <8.0                    0- 6            7.5-8.5   American Diabetes Association. Diabetes Care 33(S1), Jan 2010.    Estimated Average Glucose 07/03/2023 123  MG/DL Final       Physical Exam  CONSTITUTIONAL - well nourished, well developed, looks like stated age, in no acute distress, not ill-appearing, and not tired appearing  SKIN - normal skin color and pigmentation, normal skin turgor without rash, lesions, or nodules visualized  HEAD - no trauma, normocephalic  EYES - normal external exam  CHEST -no distressed breathing, good effort  EXTREMITIES - no edema, no deformities  NEUROLOGICAL - normal balance, normal motor, no ataxia  PSYCHIATRIC - alert, pleasant and cordial, age-appropriate    Assessment/Plan   1. ADHD  UDS/CSA up-to-date from 2023, February  Refills provided  Continue Adderall 15 mg twice daily  I only sent 1 refill of the Adderall  We will have you follow-up in 1 month due to this being virtual to discuss this further and then send over 3 more months at that time    2.  Hypertension  We will continue olmesartan at 40 mg daily  I would like to have you monitor and record blood pressures at home  Blood pressure goal should be below 130/80, ideally 120/80    Please follow-up in 1 month as stated above so we can follow-up appropriately for your hypertension and ADHD

## 2023-11-03 ENCOUNTER — OFFICE VISIT (OUTPATIENT)
Dept: PRIMARY CARE | Facility: CLINIC | Age: 43
End: 2023-11-03
Payer: COMMERCIAL

## 2023-11-03 VITALS
BODY MASS INDEX: 29.4 KG/M2 | DIASTOLIC BLOOD PRESSURE: 90 MMHG | HEIGHT: 71 IN | HEART RATE: 96 BPM | SYSTOLIC BLOOD PRESSURE: 130 MMHG | WEIGHT: 210 LBS

## 2023-11-03 DIAGNOSIS — F98.8 ATTENTION DEFICIT DISORDER, UNSPECIFIED HYPERACTIVITY PRESENCE: ICD-10-CM

## 2023-11-03 DIAGNOSIS — R03.0 ELEVATED BLOOD PRESSURE READING: ICD-10-CM

## 2023-11-03 PROCEDURE — 99214 OFFICE O/P EST MOD 30 MIN: CPT | Performed by: STUDENT IN AN ORGANIZED HEALTH CARE EDUCATION/TRAINING PROGRAM

## 2023-11-03 RX ORDER — OLMESARTAN MEDOXOMIL 40 MG/1
40 TABLET ORAL DAILY
Qty: 90 TABLET | Refills: 1 | Status: SHIPPED | OUTPATIENT
Start: 2023-11-03 | End: 2024-02-02 | Stop reason: SDUPTHER

## 2023-11-03 RX ORDER — DEXTROAMPHETAMINE SACCHARATE, AMPHETAMINE ASPARTATE, DEXTROAMPHETAMINE SULFATE AND AMPHETAMINE SULFATE 3.75; 3.75; 3.75; 3.75 MG/1; MG/1; MG/1; MG/1
15 TABLET ORAL 2 TIMES DAILY
Qty: 60 TABLET | Refills: 0 | Status: SHIPPED | OUTPATIENT
Start: 2024-01-02 | End: 2024-02-02 | Stop reason: SDUPTHER

## 2023-11-03 RX ORDER — DEXTROAMPHETAMINE SACCHARATE, AMPHETAMINE ASPARTATE, DEXTROAMPHETAMINE SULFATE AND AMPHETAMINE SULFATE 3.75; 3.75; 3.75; 3.75 MG/1; MG/1; MG/1; MG/1
15 TABLET ORAL 2 TIMES DAILY
Qty: 60 TABLET | Refills: 0 | Status: SHIPPED | OUTPATIENT
Start: 2023-11-03 | End: 2024-02-02 | Stop reason: SDUPTHER

## 2023-11-03 RX ORDER — DEXTROAMPHETAMINE SACCHARATE, AMPHETAMINE ASPARTATE, DEXTROAMPHETAMINE SULFATE AND AMPHETAMINE SULFATE 3.75; 3.75; 3.75; 3.75 MG/1; MG/1; MG/1; MG/1
15 TABLET ORAL 2 TIMES DAILY
Qty: 60 TABLET | Refills: 0 | Status: SHIPPED | OUTPATIENT
Start: 2023-12-03 | End: 2024-02-02 | Stop reason: SDUPTHER

## 2023-11-03 ASSESSMENT — PATIENT HEALTH QUESTIONNAIRE - PHQ9
SUM OF ALL RESPONSES TO PHQ9 QUESTIONS 1 AND 2: 0
1. LITTLE INTEREST OR PLEASURE IN DOING THINGS: NOT AT ALL
2. FEELING DOWN, DEPRESSED OR HOPELESS: NOT AT ALL

## 2023-11-03 NOTE — PROGRESS NOTES
"Subjective   Patient ID: Esequiel Gonzalez is a 43 y.o. male who presents for ADHD and Hypertension.  Today he is accompanied by alone.     HPI  ADHD  Continues to take Adderall 15 mg twice daily as needed in the chart  UDS/CSA up-to-date from February 2023  Requests 3mo refill  Denies side effects    2. Hypertension  BP on intake and repeat was 130/90  States that is a good for him as it has been much higher in the past  Has been taking medication without any side effects.  Denies any cardiopulmonary symptoms  Currently taking olmesartan 40 mg    Current Outpatient Medications on File Prior to Visit   Medication Sig Dispense Refill    amphetamine-dextroamphetamine (Adderall) 15 mg tablet Take 1 tablet (15 mg) by mouth 2 times a day. Do not start before August 2, 2023. 60 tablet 0    amphetamine-dextroamphetamine (Adderall) 15 mg tablet Take 1 tablet (15 mg) by mouth 2 times a day. Do not start before September 1, 2023. 60 tablet 0    amphetamine-dextroamphetamine (Adderall) 15 mg tablet Take 1 tablet (15 mg) by mouth 2 times a day. 60 tablet 0    olmesartan (BENIcar) 40 mg tablet Take 1 tablet (40 mg) by mouth once daily. 90 tablet 1     No current facility-administered medications on file prior to visit.        No Known Allergies    Immunization History   Administered Date(s) Administered    Tdap vaccine, age 7 year and older (BOOSTRIX) 05/02/2014, 06/08/2021         Review of Systems  All pertinent positive symptoms are included in the history of present illness.  All other systems have been reviewed and are negative and noncontributory to this patient's current ailments.     Objective   /90 (BP Location: Left arm, Patient Position: Sitting, BP Cuff Size: Adult)   Pulse 96   Ht 1.803 m (5' 11\")   Wt 95.3 kg (210 lb)   BMI 29.29 kg/m²   BSA: 2.18 meters squared  No visits with results within 1 Month(s) from this visit.   Latest known visit with results is:   Lab on 07/03/2023   Component Date Value Ref Range " Status    PSA 07/03/2023 2.43  0.00 - 4.00 ng/mL Final    The FDA requires that the method used for PSA assay be   reported to the physician. Values obtained with different   assay methods must not be used interchangeably. This test  was performed at [facility] using the Access   [employer] PSA assay is a two-site immunoenzymatic sandwich   assay. The assay is approved for measurement of   prostate-specific antigen (PSA)in serum and may be used   in conjunction with a digital rectal examination in men   50 years and older as an aid in detection of prostate   cancer.  5-Alpha-reductase inhibitors (e.g. Proscar, Finasteride,   Avodart, Dutasteride and Linda) for the treatment of BPH   have been shown to lower PSA levels by an average of 50%   after 6 months of treatment.    TSH 07/03/2023 1.80  0.44 - 3.98 mIU/L Final     TSH testing is performed using different testing    methodology at [facility] than at other    [facility]. Direct result comparisons should    only be made within the same method.    Cholesterol 07/03/2023 233 (H)  0 - 199 mg/dL Final    .      AGE      DESIRABLE   BORDERLINE HIGH   HIGH     0-19 Y     0 - 169       170 - 199     >/= 200    20-24 Y     0 - 189       190 - 224     >/= 225         >24 Y     0 - 199       200 - 239     >/= 240   **All ranges are based on fasting samples. Specific   therapeutic targets will vary based on patient-specific   cardiac risk.  .   Pediatric guidelines reference:Pediatrics 2011, 128(S5).   Adult guidelines reference: NCEP ATPIII Guidelines,     HOESA 2001, 258:2486-97  .   Venipuncture immediately after or during the    administration of Metamizole may lead to falsely   low results. Testing should be performed immediately   prior to Metamizole dosing.    HDL 07/03/2023 102.1  mg/dL Final    .      AGE      VERY LOW   LOW     NORMAL    HIGH       0-19 Y       < 35   < 40     40-45     ----    20-24 Y       ----   < 40       >45      ----      >24 Y       ----   < 40     40-60      >60  .    Cholesterol/HDL Ratio 07/03/2023 2.3   Final    REF VALUES  DESIRABLE  < 3.4  HIGH RISK  > 5.0    LDL 07/03/2023 117 (H)  0 - 99 mg/dL Final    .                           NEAR      BORD      AGE      DESIRABLE  OPTIMAL    HIGH     HIGH     VERY HIGH     0-19 Y     0 - 109     ---    110-129   >/= 130     ----    20-24 Y     0 - 119     ---    120-159   >/= 160     ----      >24 Y     0 -  99   100-129  130-159   160-189     >/=190  .    VLDL 07/03/2023 14  0 - 40 mg/dL Final    Triglycerides 07/03/2023 68  0 - 149 mg/dL Final    .      AGE      DESIRABLE   BORDERLINE HIGH   HIGH     VERY HIGH   0 D-90 D    19 - 174         ----         ----        ----  91 D- 9 Y     0 -  74        75 -  99     >/= 100      ----    10-19 Y     0 -  89        90 - 129     >/= 130      ----    20-24 Y     0 - 114       115 - 149     >/= 150      ----         >24 Y     0 - 149       150 - 199    200- 499    >/= 500  .   Venipuncture immediately after or during the    administration of Metamizole may lead to falsely   low results. Testing should be performed immediately   prior to Metamizole dosing.    Glucose 07/03/2023 133 (H)  74 - 99 mg/dL Final    Sodium 07/03/2023 138  136 - 145 mmol/L Final    Potassium 07/03/2023 3.7  3.5 - 5.3 mmol/L Final    Chloride 07/03/2023 102  98 - 107 mmol/L Final    Bicarbonate 07/03/2023 27  21 - 32 mmol/L Final    Anion Gap 07/03/2023 13  10 - 20 mmol/L Final    Urea Nitrogen 07/03/2023 14  6 - 23 mg/dL Final    Creatinine 07/03/2023 0.92  0.50 - 1.30 mg/dL Final    GFR MALE 07/03/2023 >90  >90 mL/min/1.73m2 Final     CALCULATIONS OF ESTIMATED GFR ARE PERFORMED   USING THE 2021 CKD-EPI STUDY REFIT EQUATION   WITHOUT THE RACE VARIABLE FOR THE IDMS-TRACEABLE   CREATININE METHODS.    https://jasn.asnjournals.org/content/early/2021/09/22/ASN.7785454249    Calcium 07/03/2023 9.5  8.6 - 10.3 mg/dL Final    Albumin 07/03/2023 4.2  3.4 - 5.0 g/dL  Final    Alkaline Phosphatase 07/03/2023 63  33 - 120 U/L Final    Total Protein 07/03/2023 7.2  6.4 - 8.2 g/dL Final    AST 07/03/2023 21  9 - 39 U/L Final    Total Bilirubin 07/03/2023 0.9  0.0 - 1.2 mg/dL Final    ALT (SGPT) 07/03/2023 31  10 - 52 U/L Final     Patients treated with Sulfasalazine may generate    falsely decreased results for ALT.    WBC 07/03/2023 4.6  4.4 - 11.3 x10E9/L Final    RBC 07/03/2023 5.17  4.50 - 5.90 x10E12/L Final    Hemoglobin 07/03/2023 15.9  13.5 - 17.5 g/dL Final    Hematocrit 07/03/2023 48.8  41.0 - 52.0 % Final    MCV 07/03/2023 94  80 - 100 fL Final    MCHC 07/03/2023 32.6  32.0 - 36.0 g/dL Final    Platelets 07/03/2023 205  150 - 450 x10E9/L Final    RDW 07/03/2023 14.2  11.5 - 14.5 % Final    Neutrophils % 07/03/2023 67.6  40.0 - 80.0 % Final    Immature Granulocytes %, Automated 07/03/2023 0.2  0.0 - 0.9 % Final     Immature Granulocyte Count (IG) includes promyelocytes,    myelocytes and metamyelocytes but does not include bands.   Percent differential counts (%) should be interpreted in the   context of the absolute cell counts (cells/L).    Lymphocytes % 07/03/2023 19.7  13.0 - 44.0 % Final    Monocytes % 07/03/2023 10.9  2.0 - 10.0 % Final    Eosinophils % 07/03/2023 0.9  0.0 - 6.0 % Final    Basophils % 07/03/2023 0.7  0.0 - 2.0 % Final    Neutrophils Absolute 07/03/2023 3.09  1.20 - 7.70 x10E9/L Final    Lymphocytes Absolute 07/03/2023 0.90 (L)  1.20 - 4.80 x10E9/L Final    Monocytes Absolute 07/03/2023 0.50  0.10 - 1.00 x10E9/L Final    Eosinophils Absolute 07/03/2023 0.04  0.00 - 0.70 x10E9/L Final    Basophils Absolute 07/03/2023 0.03  0.00 - 0.10 x10E9/L Final    Hemoglobin A1C 07/03/2023 5.9 (A)  % Final         Diagnosis of Diabetes-Adults   Non-Diabetic: < or = 5.6%   Increased risk for developing diabetes: 5.7-6.4%   Diagnostic of diabetes: > or = 6.5%  .       Monitoring of Diabetes                Age (y)     Therapeutic Goal (%)   Adults:          >18            <7.0   Pediatrics:    13-18           <7.5                   7-12           <8.0                   0- 6            7.5-8.5   American Diabetes Association. Diabetes Care 33(S1), Jan 2010.    Estimated Average Glucose 07/03/2023 123  MG/DL Final       Physical Exam  CONSTITUTIONAL - well nourished, well developed, looks like stated age, in no acute distress, not ill-appearing, and not tired appearing  SKIN - normal skin color and pigmentation, normal skin turgor without rash, lesions, or nodules visualized  HEAD - no trauma, normocephalic  EYES - normal external exam  CHEST -no distressed breathing, good effort  EXTREMITIES - no edema, no deformities  NEUROLOGICAL - normal balance, normal motor, no ataxia  PSYCHIATRIC - alert, pleasant and cordial, age-appropriate     Assessment/Plan   ADHD  UDS/CSA up-to-date from 2023, February  Refills have been sent to your pharmacy today   Continue Adderall 15 mg twice daily    2. Hypertension  Continue olmesartan to 40mg  Refills have been sent to your pharmacy today   I would like to have you monitor and record blood pressures at home  Blood pressure goal should be below 130/80, ideally 120/80  We will talk about potentially increasing the dose at your next visit

## 2024-02-02 ENCOUNTER — OFFICE VISIT (OUTPATIENT)
Dept: PRIMARY CARE | Facility: CLINIC | Age: 44
End: 2024-02-02
Payer: COMMERCIAL

## 2024-02-02 VITALS
HEART RATE: 108 BPM | HEIGHT: 71 IN | WEIGHT: 224 LBS | BODY MASS INDEX: 31.36 KG/M2 | SYSTOLIC BLOOD PRESSURE: 132 MMHG | DIASTOLIC BLOOD PRESSURE: 80 MMHG

## 2024-02-02 DIAGNOSIS — Z79.899 MEDICATION MANAGEMENT: Primary | ICD-10-CM

## 2024-02-02 DIAGNOSIS — F98.8 ATTENTION DEFICIT DISORDER, UNSPECIFIED HYPERACTIVITY PRESENCE: ICD-10-CM

## 2024-02-02 DIAGNOSIS — R03.0 ELEVATED BLOOD PRESSURE READING: ICD-10-CM

## 2024-02-02 PROCEDURE — 99214 OFFICE O/P EST MOD 30 MIN: CPT | Performed by: STUDENT IN AN ORGANIZED HEALTH CARE EDUCATION/TRAINING PROGRAM

## 2024-02-02 RX ORDER — OLMESARTAN MEDOXOMIL 40 MG/1
40 TABLET ORAL DAILY
Qty: 90 TABLET | Refills: 1 | Status: SHIPPED | OUTPATIENT
Start: 2024-02-02 | End: 2024-05-23 | Stop reason: SDUPTHER

## 2024-02-02 RX ORDER — DEXTROAMPHETAMINE SACCHARATE, AMPHETAMINE ASPARTATE, DEXTROAMPHETAMINE SULFATE AND AMPHETAMINE SULFATE 3.75; 3.75; 3.75; 3.75 MG/1; MG/1; MG/1; MG/1
15 TABLET ORAL 2 TIMES DAILY
Qty: 60 TABLET | Refills: 0 | Status: SHIPPED | OUTPATIENT
Start: 2024-04-02 | End: 2024-05-01 | Stop reason: SDUPTHER

## 2024-02-02 RX ORDER — DEXTROAMPHETAMINE SACCHARATE, AMPHETAMINE ASPARTATE, DEXTROAMPHETAMINE SULFATE AND AMPHETAMINE SULFATE 3.75; 3.75; 3.75; 3.75 MG/1; MG/1; MG/1; MG/1
15 TABLET ORAL 2 TIMES DAILY
Qty: 60 TABLET | Refills: 0 | Status: SHIPPED | OUTPATIENT
Start: 2024-03-03 | End: 2024-05-23 | Stop reason: SDUPTHER

## 2024-02-02 RX ORDER — DEXTROAMPHETAMINE SACCHARATE, AMPHETAMINE ASPARTATE, DEXTROAMPHETAMINE SULFATE AND AMPHETAMINE SULFATE 3.75; 3.75; 3.75; 3.75 MG/1; MG/1; MG/1; MG/1
15 TABLET ORAL 2 TIMES DAILY
Qty: 60 TABLET | Refills: 0 | Status: SHIPPED | OUTPATIENT
Start: 2024-02-02 | End: 2024-05-23 | Stop reason: SDUPTHER

## 2024-02-02 NOTE — PROGRESS NOTES
"Subjective   Patient ID: Esequiel Gonzalez is a 43 y.o. male who presents for follow-up of his ADHD and hypertension.  Today he is accompanied by alone.     HPI  ADHD  Continues to take Adderall 15 mg twice daily as needed in the chart  UDS/CSA up-to-date from February 2023, will renew at today's visit  Requests 3mo refill  Denies side effects    2. Hypertension  Currently taking olmesartan 40 mg  BP on intake and repeat was 132/80, admits lower values at home  States that is a good for him as it has been much higher in the past  Has been taking medication without any side effects.  Denies any cardiopulmonary symptoms      Current Outpatient Medications on File Prior to Visit   Medication Sig Dispense Refill    amphetamine-dextroamphetamine (Adderall) 15 mg tablet Take 1 tablet (15 mg) by mouth 2 times a day. Do not start before December 3, 2023. 60 tablet 0    amphetamine-dextroamphetamine (Adderall) 15 mg tablet Take 1 tablet (15 mg) by mouth 2 times a day. Do not start before January 2, 2024. 60 tablet 0    amphetamine-dextroamphetamine (Adderall) 15 mg tablet Take 1 tablet (15 mg) by mouth 2 times a day. 60 tablet 0    olmesartan (BENIcar) 40 mg tablet Take 1 tablet (40 mg) by mouth once daily. 90 tablet 1     No current facility-administered medications on file prior to visit.        No Known Allergies    Immunization History   Administered Date(s) Administered    Tdap vaccine, age 7 year and older (BOOSTRIX, ADACEL) 05/02/2014, 06/08/2021         Review of Systems  All pertinent positive symptoms are included in the history of present illness.  All other systems have been reviewed and are negative and noncontributory to this patient's current ailments.     Objective   /80 (BP Location: Left arm, Patient Position: Sitting, BP Cuff Size: Large adult)   Pulse 108   Ht 1.803 m (5' 11\")   Wt 102 kg (224 lb)   BMI 31.24 kg/m²   BSA: 2.26 meters squared      Physical Exam  CONSTITUTIONAL - well nourished, well " developed, looks like stated age, in no acute distress, not ill-appearing, and not tired appearing  SKIN - normal skin color and pigmentation, normal skin turgor without rash, lesions, or nodules visualized  HEAD - no trauma, normocephalic  EYES - normal external exam  CHEST -no distressed breathing, good effort  EXTREMITIES - no edema, no deformities  NEUROLOGICAL - normal balance, normal motor, no ataxia  PSYCHIATRIC - alert, pleasant and cordial, age-appropriate     Assessment/Plan   ADHD  UDS/CSA ordered today.  Please return next week to give a urine sample  Refills have been sent to your pharmacy today   Continue Adderall 15 mg twice daily    2. Hypertension  Stable and well-controlled  Continue olmesartan to 40mg,  Advised to follow a low sodium diet  Refills have been sent to your pharmacy today   I would like to have you monitor and record blood pressures at home  Blood pressure goal should be below 130/80, ideally 120/80      Thank you for letting us be a part of your care team.  Please call the office if you have further questions or concerns regarding your care    Otherwise, please follow-up in 3 months for continued care and refills     Erika Anderson MD  PGY1 FM Resident

## 2024-02-22 ENCOUNTER — LAB (OUTPATIENT)
Dept: LAB | Facility: LAB | Age: 44
End: 2024-02-22
Payer: COMMERCIAL

## 2024-02-22 DIAGNOSIS — Z79.899 MEDICATION MANAGEMENT: ICD-10-CM

## 2024-02-22 DIAGNOSIS — F98.8 ATTENTION DEFICIT DISORDER, UNSPECIFIED HYPERACTIVITY PRESENCE: ICD-10-CM

## 2024-02-22 LAB
AMPHETAMINES UR QL SCN: ABNORMAL
BARBITURATES UR QL SCN: ABNORMAL
BENZODIAZ UR QL SCN: ABNORMAL
BZE UR QL SCN: ABNORMAL
CANNABINOIDS UR QL SCN: ABNORMAL
FENTANYL+NORFENTANYL UR QL SCN: ABNORMAL
OPIATES UR QL SCN: ABNORMAL
OXYCODONE+OXYMORPHONE UR QL SCN: ABNORMAL
PCP UR QL SCN: ABNORMAL

## 2024-02-22 PROCEDURE — 80324 DRUG SCREEN AMPHETAMINES 1/2: CPT

## 2024-02-22 PROCEDURE — 80307 DRUG TEST PRSMV CHEM ANLYZR: CPT

## 2024-02-26 LAB
AMPHET UR-MCNC: 2955 NG/ML
MDA UR-MCNC: <200 NG/ML
MDEA UR-MCNC: <200 NG/ML
MDMA UR-MCNC: <200 NG/ML
METHAMPHET UR-MCNC: <200 NG/ML
PHENTERMINE UR CFM-MCNC: <200 NG/ML

## 2024-05-01 ENCOUNTER — TELEMEDICINE (OUTPATIENT)
Dept: PRIMARY CARE | Facility: CLINIC | Age: 44
End: 2024-05-01
Payer: COMMERCIAL

## 2024-05-01 DIAGNOSIS — F98.8 ATTENTION DEFICIT DISORDER, UNSPECIFIED HYPERACTIVITY PRESENCE: ICD-10-CM

## 2024-05-01 PROCEDURE — 99213 OFFICE O/P EST LOW 20 MIN: CPT | Performed by: STUDENT IN AN ORGANIZED HEALTH CARE EDUCATION/TRAINING PROGRAM

## 2024-05-01 RX ORDER — DEXTROAMPHETAMINE SACCHARATE, AMPHETAMINE ASPARTATE, DEXTROAMPHETAMINE SULFATE AND AMPHETAMINE SULFATE 3.75; 3.75; 3.75; 3.75 MG/1; MG/1; MG/1; MG/1
15 TABLET ORAL 2 TIMES DAILY
Qty: 60 TABLET | Refills: 0 | Status: SHIPPED | OUTPATIENT
Start: 2024-05-01 | End: 2024-05-23 | Stop reason: SDUPTHER

## 2024-05-01 NOTE — PROGRESS NOTES
Subjective   Patient ID: Esequiel Gonzalez is a 43 y.o. male who presents for follow-up of his ADHD and hypertension.  Today he is accompanied by alone.     Calling into the office today for a telemedicine virtual visit and scheduling his next in person visit at the end of the month    HPI  ADHD  Continues to take Adderall 15 mg twice daily as needed in the chart  UDS/CSA up-to-date from February 2024  Feels well and requesting a 1 month refill due to this being virtual appointment in nature    2. Hypertension  Currently taking olmesartan 40 mg  Blood pressure doing better at home  Denies any cardiopulmonary symptoms      Current Outpatient Medications on File Prior to Visit   Medication Sig Dispense Refill    amphetamine-dextroamphetamine (Adderall) 15 mg tablet Take 1 tablet (15 mg) by mouth 2 times a day. 60 tablet 0    amphetamine-dextroamphetamine (Adderall) 15 mg tablet Take 1 tablet (15 mg) by mouth 2 times a day. Do not start before March 3, 2024. 60 tablet 0    amphetamine-dextroamphetamine (Adderall) 15 mg tablet Take 1 tablet (15 mg) by mouth 2 times a day. Do not start before April 2, 2024. 60 tablet 0    olmesartan (BENIcar) 40 mg tablet Take 1 tablet (40 mg) by mouth once daily. 90 tablet 1     No current facility-administered medications on file prior to visit.        No Known Allergies    Immunization History   Administered Date(s) Administered    Tdap vaccine, age 7 year and older (BOOSTRIX, ADACEL) 05/02/2014, 06/08/2021         Review of Systems  All pertinent positive symptoms are included in the history of present illness.  All other systems have been reviewed and are negative and noncontributory to this patient's current ailments.     Objective   There were no vitals taken for this visit.  BSA: There is no height or weight on file to calculate BSA.      Physical Exam  CONSTITUTIONAL - well nourished, well developed, looks like stated age, in no acute distress, not ill-appearing, and not tired  appearing  SKIN - normal skin color and pigmentation, normal skin turgor without rash, lesions, or nodules visualized  HEAD - no trauma, normocephalic  EYES - normal external exam  CHEST -no distressed breathing, good effort  EXTREMITIES - no edema, no deformities  NEUROLOGICAL - normal balance, normal motor, no ataxia  PSYCHIATRIC - alert, pleasant and cordial, age-appropriate    Assessment/Plan   ADHD  UDS/CSA up-to-date  Refills have been sent to your pharmacy today   Continue Adderall 15 mg twice daily  A 1 month refill was sent to your pharmacy  We will follow-up at the end of the month to provide your 3-month    2. Hypertension  Stable and well-controlled  Continue olmesartan to 40mg,  I would like to have you monitor and record blood pressures at home  Blood pressure goal should be below 130/80, ideally 120/80

## 2024-05-23 ENCOUNTER — OFFICE VISIT (OUTPATIENT)
Dept: PRIMARY CARE | Facility: CLINIC | Age: 44
End: 2024-05-23
Payer: COMMERCIAL

## 2024-05-23 ENCOUNTER — LAB (OUTPATIENT)
Dept: LAB | Facility: LAB | Age: 44
End: 2024-05-23
Payer: COMMERCIAL

## 2024-05-23 VITALS
BODY MASS INDEX: 31.66 KG/M2 | HEART RATE: 105 BPM | WEIGHT: 227 LBS | SYSTOLIC BLOOD PRESSURE: 124 MMHG | DIASTOLIC BLOOD PRESSURE: 80 MMHG | OXYGEN SATURATION: 96 %

## 2024-05-23 DIAGNOSIS — F98.8 ATTENTION DEFICIT DISORDER, UNSPECIFIED HYPERACTIVITY PRESENCE: ICD-10-CM

## 2024-05-23 DIAGNOSIS — R03.0 ELEVATED BLOOD PRESSURE READING: Primary | ICD-10-CM

## 2024-05-23 DIAGNOSIS — R53.83 OTHER FATIGUE: ICD-10-CM

## 2024-05-23 PROCEDURE — 84402 ASSAY OF FREE TESTOSTERONE: CPT

## 2024-05-23 PROCEDURE — 36415 COLL VENOUS BLD VENIPUNCTURE: CPT

## 2024-05-23 PROCEDURE — 99214 OFFICE O/P EST MOD 30 MIN: CPT | Performed by: STUDENT IN AN ORGANIZED HEALTH CARE EDUCATION/TRAINING PROGRAM

## 2024-05-23 RX ORDER — DEXTROAMPHETAMINE SACCHARATE, AMPHETAMINE ASPARTATE, DEXTROAMPHETAMINE SULFATE AND AMPHETAMINE SULFATE 3.75; 3.75; 3.75; 3.75 MG/1; MG/1; MG/1; MG/1
15 TABLET ORAL 2 TIMES DAILY
Qty: 60 TABLET | Refills: 0 | Status: SHIPPED | OUTPATIENT
Start: 2024-06-27 | End: 2024-07-27

## 2024-05-23 RX ORDER — OLMESARTAN MEDOXOMIL 40 MG/1
40 TABLET ORAL DAILY
Qty: 90 TABLET | Refills: 1 | Status: SHIPPED | OUTPATIENT
Start: 2024-05-23

## 2024-05-23 RX ORDER — DEXTROAMPHETAMINE SACCHARATE, AMPHETAMINE ASPARTATE, DEXTROAMPHETAMINE SULFATE AND AMPHETAMINE SULFATE 3.75; 3.75; 3.75; 3.75 MG/1; MG/1; MG/1; MG/1
15 TABLET ORAL 2 TIMES DAILY
Qty: 60 TABLET | Refills: 0 | Status: SHIPPED | OUTPATIENT
Start: 2024-07-27 | End: 2024-08-26

## 2024-05-23 RX ORDER — DEXTROAMPHETAMINE SACCHARATE, AMPHETAMINE ASPARTATE, DEXTROAMPHETAMINE SULFATE AND AMPHETAMINE SULFATE 3.75; 3.75; 3.75; 3.75 MG/1; MG/1; MG/1; MG/1
15 TABLET ORAL 2 TIMES DAILY
Qty: 60 TABLET | Refills: 0 | Status: SHIPPED | OUTPATIENT
Start: 2024-05-23 | End: 2024-06-22

## 2024-05-23 ASSESSMENT — PATIENT HEALTH QUESTIONNAIRE - PHQ9
1. LITTLE INTEREST OR PLEASURE IN DOING THINGS: NOT AT ALL
SUM OF ALL RESPONSES TO PHQ9 QUESTIONS 1 AND 2: 0
2. FEELING DOWN, DEPRESSED OR HOPELESS: NOT AT ALL

## 2024-05-23 NOTE — PROGRESS NOTES
Subjective   Patient ID: Esequiel Gonzalez is a 43 y.o. male who presents for follow-up of his ADHD and hypertension.  Today he is accompanied by alone.     Patient is presenting into the office today to follow-up for a few concerns as noted in the chart below      ADHD  Continues to take Adderall 15 mg twice daily as needed in the chart  UDS/CSA up-to-date from February 2024  Feels well without any major issues/complaints  Feels that his focus continues to remain stable on this current medication regimen  Requesting refills to be sent to his local pharmacy    2. Hypertension  Currently taking olmesartan 40 mg  Blood pressure doing better at home  Denies any cardiopulmonary symptoms  Today his blood pressure was 124/80    3.  Fatigue  Has been noticing signs/symptoms of fatigue  Wondering if this could be affecting his testosterone  Asking to see if this can could be tested      Current Outpatient Medications on File Prior to Visit   Medication Sig Dispense Refill    [DISCONTINUED] amphetamine-dextroamphetamine (Adderall) 15 mg tablet Take 1 tablet (15 mg) by mouth 2 times a day. 60 tablet 0    [DISCONTINUED] amphetamine-dextroamphetamine (Adderall) 15 mg tablet Take 1 tablet (15 mg) by mouth 2 times a day. Do not start before March 3, 2024. 60 tablet 0    [DISCONTINUED] amphetamine-dextroamphetamine (Adderall) 15 mg tablet Take 1 tablet (15 mg) by mouth 2 times a day. 60 tablet 0    [DISCONTINUED] olmesartan (BENIcar) 40 mg tablet Take 1 tablet (40 mg) by mouth once daily. 90 tablet 1     No current facility-administered medications on file prior to visit.        No Known Allergies    Immunization History   Administered Date(s) Administered    Tdap vaccine, age 7 year and older (BOOSTRIX, ADACEL) 05/02/2014, 06/08/2021         Review of Systems  All pertinent positive symptoms are included in the history of present illness.  All other systems have been reviewed and are negative and noncontributory to this patient's  current ailments.     Objective   /80 (BP Location: Left arm, Patient Position: Sitting, BP Cuff Size: Adult)   Pulse 105   Wt 103 kg (227 lb)   SpO2 96%   BMI 31.66 kg/m²   BSA: 2.27 meters squared      Physical Exam  CONSTITUTIONAL - well nourished, well developed, looks like stated age, in no acute distress, not ill-appearing, and not tired appearing  SKIN - normal skin color and pigmentation, normal skin turgor without rash, lesions, or nodules visualized  HEAD - no trauma, normocephalic  EYES - normal external exam  CHEST -no distressed breathing, good effort, heart regular rate and rhythm, no murmurs, rubs, or gallops, lungs clear to auscultation bilaterally  EXTREMITIES - no edema, no deformities  NEUROLOGICAL - normal balance, normal motor, no ataxia  PSYCHIATRIC - alert, pleasant and cordial, age-appropriate    Assessment/Plan   ADHD  UDS/CSA up-to-date  Refills have been sent to your pharmacy today   Continue Adderall 15 mg twice daily  Please follow-up in 3 months for continued care and refills    2. Hypertension  Stable and well-controlled  Continue olmesartan to 40mg,  I would like to have you monitor and record blood pressures at home  Blood pressure goal should be below 130/80, ideally 120/80    3.  Fatigue  I ordered a free and total testosterone  We will notify you with the results and make recommendations accordingly    Please follow-up at the end of August for further refills as well as your physical examination

## 2024-05-30 LAB
TESTOSTERONE FREE (CHAN): 89.6 PG/ML (ref 35–155)
TESTOSTERONE,TOTAL,LC-MS/MS: 375 NG/DL (ref 250–1100)

## 2024-07-16 ENCOUNTER — TELEPHONE (OUTPATIENT)
Dept: PRIMARY CARE | Facility: CLINIC | Age: 44
End: 2024-07-16
Payer: COMMERCIAL

## 2024-07-16 NOTE — TELEPHONE ENCOUNTER
Pt was seen on 3/8/21 for a cyst on his back that we had referred him to General Surgery for. I couldn't find who it was you wanted to him to follow up with, pt would like to pursue this now. Please advise.

## 2024-08-30 ENCOUNTER — TELEMEDICINE (OUTPATIENT)
Dept: PRIMARY CARE | Facility: CLINIC | Age: 44
End: 2024-08-30
Payer: COMMERCIAL

## 2024-08-30 DIAGNOSIS — F98.8 ATTENTION DEFICIT DISORDER, UNSPECIFIED HYPERACTIVITY PRESENCE: ICD-10-CM

## 2024-08-30 PROCEDURE — 99213 OFFICE O/P EST LOW 20 MIN: CPT | Performed by: STUDENT IN AN ORGANIZED HEALTH CARE EDUCATION/TRAINING PROGRAM

## 2024-08-30 RX ORDER — DEXTROAMPHETAMINE SACCHARATE, AMPHETAMINE ASPARTATE, DEXTROAMPHETAMINE SULFATE AND AMPHETAMINE SULFATE 3.75; 3.75; 3.75; 3.75 MG/1; MG/1; MG/1; MG/1
15 TABLET ORAL 2 TIMES DAILY
Qty: 60 TABLET | Refills: 0 | Status: SHIPPED | OUTPATIENT
Start: 2024-08-30 | End: 2024-09-29

## 2024-08-30 NOTE — PROGRESS NOTES
Subjective   Patient ID: Esequiel Gonzalez is a 44 y.o. male who presents for ADHD.  Today he is accompanied by alone.     This is a virtual encounter: Patient confirmed he is date of birth and address    ADHD      ADHD  Continues to take Adderall 15 mg twice daily as needed in the chart  UDS/CSA up-to-date from February 2024  Feels well without any major issues/complaints  Feels that his focus continues to remain stable on this current medication regimen  Requesting refills to be sent to his local pharmacy      Current Outpatient Medications on File Prior to Visit   Medication Sig Dispense Refill    amphetamine-dextroamphetamine (Adderall) 15 mg tablet Take 1 tablet (15 mg) by mouth 2 times a day. 60 tablet 0    amphetamine-dextroamphetamine (Adderall) 15 mg tablet Take 1 tablet (15 mg) by mouth 2 times a day. Do not fill before June 27, 2024. 60 tablet 0    amphetamine-dextroamphetamine (Adderall) 15 mg tablet Take 1 tablet (15 mg) by mouth 2 times a day. Do not fill before July 27, 2024. 60 tablet 0    olmesartan (BENIcar) 40 mg tablet Take 1 tablet (40 mg) by mouth once daily. 90 tablet 1     No current facility-administered medications on file prior to visit.        No Known Allergies    Immunization History   Administered Date(s) Administered    Tdap vaccine, age 7 year and older (BOOSTRIX, ADACEL) 05/02/2014, 06/08/2021         Review of Systems  All pertinent positive symptoms are included in the history of present illness.  All other systems have been reviewed and are negative and noncontributory to this patient's current ailments.     Objective   There were no vitals taken for this visit.  due to being a virtual encounter  BSA: There is no height or weight on file to calculate BSA.  No visits with results within 1 Month(s) from this visit.   Latest known visit with results is:   Lab on 05/23/2024   Component Date Value Ref Range Status    Testosterone, Free 05/23/2024 89.6  35.0 - 155.0 pg/mL Final       This  test was developed and its analytical performance  characteristics have been determined by [employer] [place], [place]. It has  not been cleared or approved by the U.S. Food and Drug  Administration. This assay has been validated pursuant  to the CLIA regulations and is used for clinical  purposes.           Testosterone, Total, LC-MS/MS 05/23/2024 375  250 - 1100 ng/dL Final       For additional information, please refer to  http://education.[employer]/faq/  [id number]  (This link is being provided for informational/  educational purposes only.)     This test was developed and its analytical performance  characteristics have been determined by [facility] [place]. It has  not been cleared or approved by the U.S. Food and Drug  Administration. This assay has been validated pursuant  to the CLIA regulations and is used for clinical  purposes.              Physical Exam  CONSTITUTIONAL - well nourished, well developed, looks like stated age, in no acute distress, not ill-appearing, and not tired appearing  SKIN - normal skin color and pigmentation, normal skin turgor without rash, lesions, or nodules visualized  HEAD - no trauma, normocephalic  EYES - normal external exam  CHEST -no distressed breathing, good effort  EXTREMITIES - no edema, no deformities  NEUROLOGICAL - normal balance, normal motor, no ataxia  PSYCHIATRIC - alert, pleasant and cordial, age-appropriate      Assessment/Plan   ADHD  Stable, well-controlled on current regimen of Adderall 15 mg twice daily  UDS/CSA up to date   Refills have been sent to your pharmacy for one month  Please follow up in 1 month for continued care and refills       If you have any questions/concerns, please contact our office.   Otherwise, we will see you at your next visit.     Erika Anderson MD  FM Resident, PGY2

## 2024-09-26 NOTE — PROGRESS NOTES
Subjective   Patient ID: Esequiel Gonzalez is a 44 y.o. male who presents for No chief complaint on file..  Today he is accompanied by {alone or w :[id number]}.     HPI    1. Healthcare Maintenance  Overall patient is doing well.   Immunization: Tdap UTD 2021, Influenza vaccine due  Colon Cancer Screening: No family history; Due for screening as he is now 44 years old  Diet: clean eating  Exercise: 12k steps a day. Stopped lifting for a few months   Tobacco: Chews  EtOH: Rarely Socially - homemade wine    2. ADHD  Continues to take Adderall 15 mg twice daily as needed in the chart  UDS/CSA up-to-date from February 2024  Feels well without any major issues/complaints  Feels that his focus continues to remain stable on this current medication regimen  Requesting refills to be sent to his local pharmacy    3. Hypertension  Currently taking olmesartan 40 mg  Blood pressure doing better at home  Denies any cardiopulmonary symptoms  Today his blood pressure was #    4.  Pre-diabetes  Most recent A1c on 7/03/2023 was 5.9%  Would like to recheck this after today's visit  Denies polyuria/polydipsia    Current Outpatient Medications on File Prior to Visit   Medication Sig Dispense Refill    amphetamine-dextroamphetamine (Adderall) 15 mg tablet Take 1 tablet (15 mg) by mouth 2 times a day. Do not fill before June 27, 2024. 60 tablet 0    amphetamine-dextroamphetamine (Adderall) 15 mg tablet Take 1 tablet (15 mg) by mouth 2 times a day. Do not fill before July 27, 2024. 60 tablet 0    amphetamine-dextroamphetamine (Adderall) 15 mg tablet Take 1 tablet (15 mg) by mouth 2 times a day. 60 tablet 0    olmesartan (BENIcar) 40 mg tablet Take 1 tablet (40 mg) by mouth once daily. 90 tablet 1     No current facility-administered medications on file prior to visit.        No Known Allergies    Immunization History   Administered Date(s) Administered    Tdap vaccine, age 7 year and older (BOOSTRIX, ADACEL) 05/02/2014, 06/08/2021          Review of Systems  All pertinent positive symptoms are included in the history of present illness.  All other systems have been reviewed and are negative and noncontributory to this patient's current ailments.     Objective   There were no vitals taken for this visit.  BSA: There is no height or weight on file to calculate BSA.  No visits with results within 1 Month(s) from this visit.   Latest known visit with results is:   Lab on 05/23/2024   Component Date Value Ref Range Status    Testosterone, Free 05/23/2024 89.6  35.0 - 155.0 pg/mL Final       This test was developed and its analytical performance  characteristics have been determined by [employer]. It has  not been cleared or approved by the U.S. Food and Drug  Administration. This assay has been validated pursuant  to the CLIA regulations and is used for clinical  purposes.           Testosterone, Total, LC-MS/MS 05/23/2024 375  250 - 1100 ng/dL Final       For additional information, please refer to  http://education.[employer]/faq/  [id number]  (This link is being provided for informational/  educational purposes only.)     This test was developed and its analytical performance  characteristics have been determined by [facility] [place]. It has  not been cleared or approved by the U.S. Food and Drug  Administration. This assay has been validated pursuant  to the CLIA regulations and is used for clinical  purposes.              Physical Exam  CONSTITUTIONAL - well nourished, well developed, looks like stated age, in no acute distress, not ill-appearing, and not tired appearing  SKIN - normal skin color and pigmentation, normal skin turgor without rash, lesions, or nodules visualized  HEAD - no trauma, normocephalic  EYES - pupils are equal and reactive to light, extraocular muscles are intact, and normal external exam  ENT - TM's intact, no  injection, no signs of infection, uvula midline, normal tongue movement and throat normal, no exudate, nasal passage without discharge and patent  NECK - supple without rigidity, no neck mass was observed, no thyromegaly or thyroid nodules  CHEST - clear to auscultation, no wheezing, no crackles and no rales, good effort  CARDIAC - regular rate and regular rhythm, no skipped beats, no murmur  ABDOMEN - no organomegaly, soft, nontender, nondistended, normal bowel sounds, no guarding/rebound/rigidity  EXTREMITIES - no edema, no deformities  NEUROLOGICAL - alert, oriented and no focal signs  PSYCHIATRIC - alert, pleasant and cordial, age-appropriate  IMMUNOLOGIC - no cervical lymphadenopathy     Assessment/Plan   1. Healthcare Maintenance   Complete history and physical examination was performed  EKG reveals sinus tachycardia without any acute changes  We will notify of test results once available and make treatment recommendations accordingly  A referral for a colonoscopy has been placed for you. Please schedule at your earliest convenience    2. ADHD  UDS/CSA up-to-date  Refills have been sent to your pharmacy today   Continue Adderall 15 mg twice daily  Please follow-up in 3 months for continued care and refills    3. Hypertension  Stable and well-controlled  Continue olmesartan to 40mg,  I would like to have you monitor and record blood pressures at home  Blood pressure goal should be below 130/80, ideally 120/80    4. Pre-diabetes  Your A1C of 5.9% indicates that you are in the pre-diabetic range, which means we need to be proactive about your health   By making some changes to your diet and increasing your activity level, we can work together to lower your risk of developing diabetes   We will check your A1c and results will determine if additional intervention is indicated

## 2024-09-27 ENCOUNTER — OFFICE VISIT (OUTPATIENT)
Dept: PRIMARY CARE | Facility: CLINIC | Age: 44
End: 2024-09-27
Payer: COMMERCIAL

## 2024-09-27 VITALS
BODY MASS INDEX: 30.94 KG/M2 | WEIGHT: 221 LBS | HEART RATE: 102 BPM | SYSTOLIC BLOOD PRESSURE: 122 MMHG | HEIGHT: 71 IN | OXYGEN SATURATION: 98 % | DIASTOLIC BLOOD PRESSURE: 80 MMHG

## 2024-09-27 DIAGNOSIS — Z12.11 COLON CANCER SCREENING: ICD-10-CM

## 2024-09-27 DIAGNOSIS — R73.03 PREDIABETES: ICD-10-CM

## 2024-09-27 DIAGNOSIS — Z00.00 HEALTHCARE MAINTENANCE: Primary | ICD-10-CM

## 2024-09-27 DIAGNOSIS — R03.0 ELEVATED BLOOD PRESSURE READING: ICD-10-CM

## 2024-09-27 DIAGNOSIS — F98.8 ATTENTION DEFICIT DISORDER, UNSPECIFIED HYPERACTIVITY PRESENCE: ICD-10-CM

## 2024-09-27 PROCEDURE — 99396 PREV VISIT EST AGE 40-64: CPT | Performed by: STUDENT IN AN ORGANIZED HEALTH CARE EDUCATION/TRAINING PROGRAM

## 2024-09-27 PROCEDURE — 99214 OFFICE O/P EST MOD 30 MIN: CPT | Performed by: STUDENT IN AN ORGANIZED HEALTH CARE EDUCATION/TRAINING PROGRAM

## 2024-09-27 PROCEDURE — 3008F BODY MASS INDEX DOCD: CPT | Performed by: STUDENT IN AN ORGANIZED HEALTH CARE EDUCATION/TRAINING PROGRAM

## 2024-09-27 RX ORDER — DEXTROAMPHETAMINE SACCHARATE, AMPHETAMINE ASPARTATE, DEXTROAMPHETAMINE SULFATE AND AMPHETAMINE SULFATE 3.75; 3.75; 3.75; 3.75 MG/1; MG/1; MG/1; MG/1
15 TABLET ORAL 2 TIMES DAILY
Qty: 60 TABLET | Refills: 0 | Status: SHIPPED | OUTPATIENT
Start: 2024-11-26 | End: 2024-12-26

## 2024-09-27 RX ORDER — DEXTROAMPHETAMINE SACCHARATE, AMPHETAMINE ASPARTATE, DEXTROAMPHETAMINE SULFATE AND AMPHETAMINE SULFATE 3.75; 3.75; 3.75; 3.75 MG/1; MG/1; MG/1; MG/1
15 TABLET ORAL 2 TIMES DAILY
Qty: 60 TABLET | Refills: 0 | Status: SHIPPED | OUTPATIENT
Start: 2024-10-27 | End: 2024-11-26

## 2024-09-27 RX ORDER — OLMESARTAN MEDOXOMIL 40 MG/1
40 TABLET ORAL DAILY
Qty: 90 TABLET | Refills: 1 | Status: SHIPPED | OUTPATIENT
Start: 2024-09-27

## 2024-09-27 RX ORDER — DEXTROAMPHETAMINE SACCHARATE, AMPHETAMINE ASPARTATE, DEXTROAMPHETAMINE SULFATE AND AMPHETAMINE SULFATE 3.75; 3.75; 3.75; 3.75 MG/1; MG/1; MG/1; MG/1
15 TABLET ORAL 2 TIMES DAILY
Qty: 60 TABLET | Refills: 0 | Status: SHIPPED | OUTPATIENT
Start: 2024-09-27 | End: 2024-10-27

## 2024-09-27 ASSESSMENT — PATIENT HEALTH QUESTIONNAIRE - PHQ9
1. LITTLE INTEREST OR PLEASURE IN DOING THINGS: NOT AT ALL
2. FEELING DOWN, DEPRESSED OR HOPELESS: NOT AT ALL
SUM OF ALL RESPONSES TO PHQ9 QUESTIONS 1 AND 2: 0

## 2024-09-27 NOTE — PROGRESS NOTES
Subjective   Patient ID: Esequiel Gonzalez is a 44 y.o. male who presents for Annual Exam.  Today he is accompanied by alone.     HPI  1. Healthcare Maintenance  Overall patient is doing well.   Immunization: Tdap UTD 2021, Influenza vaccine deferred at this time  Colon Cancer Screening: No family history; Due for screening as he is now 44 years old  Diet: clean eating  Exercise: 12k steps a day. Lifting for a few months   Tobacco: Chews  EtOH: Rarely Socially - homemade wine     2. ADHD  Continues to take Adderall 15 mg twice daily as needed in the chart  UDS/CSA up-to-date from February 2024  Feels well without any major issues/complaints  Feels that his focus continues to remain stable on this current medication regimen  Requesting refills to be sent to his local pharmacy     3. Hypertension  Currently taking olmesartan 40 mg  Blood pressure doing better at home  Denies any cardiopulmonary symptoms  Today his blood pressure was 130/90, on recheck it was 127/90     4.  Pre-diabetes  Most recent A1c on 7/03/2023 was 5.9%  Would like to recheck this after today's visit  Denies polyuria/polydipsia    Current Outpatient Medications on File Prior to Visit   Medication Sig Dispense Refill    [DISCONTINUED] amphetamine-dextroamphetamine (Adderall) 15 mg tablet Take 1 tablet (15 mg) by mouth 2 times a day. Do not fill before June 27, 2024. 60 tablet 0    [DISCONTINUED] amphetamine-dextroamphetamine (Adderall) 15 mg tablet Take 1 tablet (15 mg) by mouth 2 times a day. Do not fill before July 27, 2024. 60 tablet 0    [DISCONTINUED] amphetamine-dextroamphetamine (Adderall) 15 mg tablet Take 1 tablet (15 mg) by mouth 2 times a day. 60 tablet 0    [DISCONTINUED] olmesartan (BENIcar) 40 mg tablet Take 1 tablet (40 mg) by mouth once daily. 90 tablet 1     No current facility-administered medications on file prior to visit.      No Known Allergies    Immunization History   Administered Date(s) Administered    Tdap vaccine, age 7 year  "and older (BOOSTRIX, ADACEL) 05/02/2014, 06/08/2021     Review of Systems  All pertinent positive symptoms are included in the history of present illness.  All other systems have been reviewed and are negative and noncontributory to this patient's current ailments.     Objective   /80 (BP Location: Left arm, Patient Position: Sitting, BP Cuff Size: Large adult)   Pulse 102   Ht 1.803 m (5' 11\")   Wt 100 kg (221 lb)   SpO2 98%   BMI 30.82 kg/m²   BSA: 2.24 meters squared  No visits with results within 1 Month(s) from this visit.   Latest known visit with results is:   Lab on 05/23/2024   Component Date Value Ref Range Status    Testosterone, Free 05/23/2024 89.6  35.0 - 155.0 pg/mL Final       This test was developed and its analytical performance  characteristics have been determined by [employer] [place], [place]. It has  not been cleared or approved by the U.S. Food and Drug  Administration. This assay has been validated pursuant  to the CLIA regulations and is used for clinical  purposes.           Testosterone, Total, LC-MS/MS 05/23/2024 375  250 - 1100 ng/dL Final       For additional information, please refer to  http://education.[employer]/faq/  [id number]  (This link is being provided for informational/  educational purposes only.)     This test was developed and its analytical performance  characteristics have been determined by [facility] [place], [place]. It has  not been cleared or approved by the U.S. Food and Drug  Administration. This assay has been validated pursuant  to the CLIA regulations and is used for clinical  purposes.            Physical Exam  CONSTITUTIONAL - well nourished, well developed, looks like stated age, in no acute distress, not ill-appearing, and not tired appearing  SKIN - normal skin color and pigmentation, normal skin turgor without rash, lesions, or nodules visualized  HEAD - no trauma, " normocephalic  EYES - pupils are equal and reactive to light, extraocular muscles are intact, and normal external exam  ENT - TM's intact, no injection, no signs of infection, uvula midline, normal tongue movement and throat normal, no exudate, nasal passage without discharge and patent  NECK - supple without rigidity, no neck mass was observed, no thyromegaly or thyroid nodules  CHEST - clear to auscultation, no wheezing, no crackles and no rales, good effort  CARDIAC - regular rate and regular rhythm, no skipped beats, no murmur  ABDOMEN - no organomegaly, soft, nontender, nondistended, normal bowel sounds, no guarding/rebound/rigidity  EXTREMITIES - no edema, no deformities  NEUROLOGICAL - normal gait, normal balance, normal motor, no ataxia, DTRs equal and symmetrical; alert, oriented and no focal signs  PSYCHIATRIC - alert, pleasant and cordial, age-appropriate  IMMUNOLOGIC - no cervical lymphadenopathy     Assessment/Plan   1. Healthcare Maintenance   Complete history and physical examination was performed  We will notify of test results once available and make treatment recommendations accordingly  As we discussed, an order for Cologuard has been placed for you. Please complete at your earliest convenience.      2. ADHD  UDS/CSA up-to-date  Refills have been sent to your pharmacy today   Continue Adderall 15 mg twice daily  Please follow-up in 3 months for continued care and refills     3. Hypertension  Stable and well-controlled  Continue olmesartan to 40mg,  I would like to have you monitor and record blood pressures at home  Blood pressure goal should be below 130/80, ideally 120/80     4. Pre-diabetes  Your A1C of 5.9% indicates that you are in the pre-diabetic range, which means we need to be proactive about your health   By making some changes to your diet and increasing your activity level, we can work together to lower your risk of developing diabetes   We will check your A1c and results will  determine if additional intervention is indicated

## 2024-11-15 ENCOUNTER — LAB (OUTPATIENT)
Dept: LAB | Facility: LAB | Age: 44
End: 2024-11-15
Payer: COMMERCIAL

## 2024-11-15 DIAGNOSIS — Z00.00 HEALTHCARE MAINTENANCE: ICD-10-CM

## 2024-11-15 LAB
ALBUMIN SERPL BCP-MCNC: 4.2 G/DL (ref 3.4–5)
ALP SERPL-CCNC: 56 U/L (ref 33–120)
ALT SERPL W P-5'-P-CCNC: 41 U/L (ref 10–52)
ANION GAP SERPL CALC-SCNC: 12 MMOL/L (ref 10–20)
AST SERPL W P-5'-P-CCNC: 26 U/L (ref 9–39)
BASOPHILS # BLD AUTO: 0.04 X10*3/UL (ref 0–0.1)
BASOPHILS NFR BLD AUTO: 1.1 %
BILIRUB SERPL-MCNC: 0.5 MG/DL (ref 0–1.2)
BUN SERPL-MCNC: 13 MG/DL (ref 6–23)
CALCIUM SERPL-MCNC: 8.8 MG/DL (ref 8.6–10.3)
CHLORIDE SERPL-SCNC: 106 MMOL/L (ref 98–107)
CHOLEST SERPL-MCNC: 238 MG/DL (ref 0–199)
CHOLESTEROL/HDL RATIO: 3.1
CO2 SERPL-SCNC: 27 MMOL/L (ref 21–32)
CREAT SERPL-MCNC: 0.9 MG/DL (ref 0.5–1.3)
EGFRCR SERPLBLD CKD-EPI 2021: >90 ML/MIN/1.73M*2
EOSINOPHIL # BLD AUTO: 0.18 X10*3/UL (ref 0–0.7)
EOSINOPHIL NFR BLD AUTO: 4.8 %
ERYTHROCYTE [DISTWIDTH] IN BLOOD BY AUTOMATED COUNT: 13.5 % (ref 11.5–14.5)
EST. AVERAGE GLUCOSE BLD GHB EST-MCNC: 128 MG/DL
GLUCOSE SERPL-MCNC: 97 MG/DL (ref 74–99)
HBA1C MFR BLD: 6.1 %
HCT VFR BLD AUTO: 46.4 % (ref 41–52)
HCV AB SER QL: NONREACTIVE
HDLC SERPL-MCNC: 77.1 MG/DL
HGB BLD-MCNC: 15.1 G/DL (ref 13.5–17.5)
HIV 1+2 AB+HIV1 P24 AG SERPL QL IA: NONREACTIVE
IMM GRANULOCYTES # BLD AUTO: 0.01 X10*3/UL (ref 0–0.7)
IMM GRANULOCYTES NFR BLD AUTO: 0.3 % (ref 0–0.9)
LDLC SERPL CALC-MCNC: 135 MG/DL
LYMPHOCYTES # BLD AUTO: 1.04 X10*3/UL (ref 1.2–4.8)
LYMPHOCYTES NFR BLD AUTO: 27.8 %
MCH RBC QN AUTO: 30.8 PG (ref 26–34)
MCHC RBC AUTO-ENTMCNC: 32.5 G/DL (ref 32–36)
MCV RBC AUTO: 95 FL (ref 80–100)
MONOCYTES # BLD AUTO: 0.38 X10*3/UL (ref 0.1–1)
MONOCYTES NFR BLD AUTO: 10.2 %
NEUTROPHILS # BLD AUTO: 2.09 X10*3/UL (ref 1.2–7.7)
NEUTROPHILS NFR BLD AUTO: 55.8 %
NON HDL CHOLESTEROL: 161 MG/DL (ref 0–149)
NRBC BLD-RTO: 0 /100 WBCS (ref 0–0)
PLATELET # BLD AUTO: 207 X10*3/UL (ref 150–450)
POTASSIUM SERPL-SCNC: 4.7 MMOL/L (ref 3.5–5.3)
PROT SERPL-MCNC: 6.7 G/DL (ref 6.4–8.2)
PSA SERPL-MCNC: 0.56 NG/ML
RBC # BLD AUTO: 4.9 X10*6/UL (ref 4.5–5.9)
SODIUM SERPL-SCNC: 140 MMOL/L (ref 136–145)
TRIGL SERPL-MCNC: 131 MG/DL (ref 0–149)
TSH SERPL-ACNC: 1.75 MIU/L (ref 0.44–3.98)
VLDL: 26 MG/DL (ref 0–40)
WBC # BLD AUTO: 3.7 X10*3/UL (ref 4.4–11.3)

## 2024-11-15 PROCEDURE — 83036 HEMOGLOBIN GLYCOSYLATED A1C: CPT

## 2024-11-15 PROCEDURE — 80053 COMPREHEN METABOLIC PANEL: CPT

## 2024-11-15 PROCEDURE — 84443 ASSAY THYROID STIM HORMONE: CPT

## 2024-11-15 PROCEDURE — 80061 LIPID PANEL: CPT

## 2024-11-15 PROCEDURE — 86803 HEPATITIS C AB TEST: CPT

## 2024-11-15 PROCEDURE — 36415 COLL VENOUS BLD VENIPUNCTURE: CPT

## 2024-11-15 PROCEDURE — 87389 HIV-1 AG W/HIV-1&-2 AB AG IA: CPT

## 2024-11-15 PROCEDURE — 84153 ASSAY OF PSA TOTAL: CPT

## 2024-11-15 PROCEDURE — 85025 COMPLETE CBC W/AUTO DIFF WBC: CPT

## 2024-11-17 NOTE — RESULT ENCOUNTER NOTE
Hemoglobin A1c increased slightly at 6.1% which continues to show prediabetes    Complete blood cell count shows a lower white blood cell count but this overall has been stable    Cholesterol is elevated 238, HDL 77, , triglycerides 131    HIV and hepatitis C are both negative    Sugar, kidneys, liver, electrolytes are all within normal limits    Thyroid within normal limits alongside a normal prostate

## 2024-12-30 ENCOUNTER — TELEMEDICINE (OUTPATIENT)
Dept: PRIMARY CARE | Facility: CLINIC | Age: 44
End: 2024-12-30
Payer: COMMERCIAL

## 2024-12-30 DIAGNOSIS — R73.03 PREDIABETES: ICD-10-CM

## 2024-12-30 DIAGNOSIS — F90.9 ATTENTION DEFICIT HYPERACTIVITY DISORDER (ADHD), UNSPECIFIED ADHD TYPE: Primary | ICD-10-CM

## 2024-12-30 PROCEDURE — 99213 OFFICE O/P EST LOW 20 MIN: CPT | Performed by: STUDENT IN AN ORGANIZED HEALTH CARE EDUCATION/TRAINING PROGRAM

## 2024-12-30 RX ORDER — DEXTROAMPHETAMINE SACCHARATE, AMPHETAMINE ASPARTATE, DEXTROAMPHETAMINE SULFATE AND AMPHETAMINE SULFATE 3.75; 3.75; 3.75; 3.75 MG/1; MG/1; MG/1; MG/1
15 TABLET ORAL 2 TIMES DAILY
Qty: 60 TABLET | Refills: 0 | Status: SHIPPED | OUTPATIENT
Start: 2024-12-30 | End: 2025-01-29

## 2024-12-30 NOTE — PROGRESS NOTES
Subjective   Patient ID: Esequiel Gonzalez is a 44 y.o. male who presents for ADHD  Today he is accompanied by alone.         ADHD      ADHD  Continues to take Adderall 15 mg twice daily as needed in the chart  UDS/CSA up-to-date from February 2024  Feels well without any major issues/complaints  Feels that his focus continues to remain stable on this current medication regimen  Requesting refills to be sent to his local pharmacy      Current Outpatient Medications on File Prior to Visit   Medication Sig Dispense Refill    amphetamine-dextroamphetamine (Adderall) 15 mg tablet Take 1 tablet (15 mg) by mouth 2 times a day. Do not fill before November 26, 2024. 60 tablet 0    amphetamine-dextroamphetamine (Adderall) 15 mg tablet Take 1 tablet (15 mg) by mouth 2 times a day. Do not fill before October 27, 2024. 60 tablet 0    amphetamine-dextroamphetamine (Adderall) 15 mg tablet Take 1 tablet (15 mg) by mouth 2 times a day. 60 tablet 0    olmesartan (BENIcar) 40 mg tablet Take 1 tablet (40 mg) by mouth once daily. 90 tablet 1     No current facility-administered medications on file prior to visit.        No Known Allergies    Immunization History   Administered Date(s) Administered    Tdap vaccine, age 7 year and older (BOOSTRIX, ADACEL) 05/02/2014, 06/08/2021         Review of Systems  All pertinent positive symptoms are included in the history of present illness.  All other systems have been reviewed and are negative and noncontributory to this patient's current ailments.     Objective   There were no vitals taken for this visit.  due to being a virtual encounter  BSA: There is no height or weight on file to calculate BSA.  No visits with results within 1 Month(s) from this visit.   Latest known visit with results is:   Lab on 11/15/2024   Component Date Value Ref Range Status    Prostate Specific AG 11/15/2024 0.56  <=4.00 ng/mL Final    Thyroid Stimulating Hormone 11/15/2024 1.75  0.44 - 3.98 mIU/L Final    Cholesterol  11/15/2024 238 (H)  0 - 199 mg/dL Final          Age      Desirable   Borderline High   High     0-19 Y     0 - 169       170 - 199     >/= 200    20-24 Y     0 - 189       190 - 224     >/= 225         >24 Y     0 - 199       200 - 239     >/= 240   **All ranges are based on fasting samples. Specific   therapeutic targets will vary based on patient-specific   cardiac risk.    Pediatric guidelines reference:Pediatrics 2011, 128(S5).Adult guidelines reference: NCEP ATPIII Guidelines,[place] 2001, 258:2486-97    Venipuncture immediately after or during the administration of Metamizole may lead to falsely low results. Testing should be performed immediately prior to Metamizole dosing.    HDL-Cholesterol 11/15/2024 77.1  mg/dL Final      Age       Very Low   Low     Normal    High    0-19 Y    < 35      < 40     40-45     ----  20-24 Y    ----     < 40      >45      ----        >24 Y      ----     < 40     40-60      >60      Cholesterol/HDL Ratio 11/15/2024 3.1   Final      Ref Values  Desirable  < 3.4  High Risk  > 5.0    LDL Calculated 11/15/2024 135 (H)  <=99 mg/dL Final                                Near   Borderline      AGE      Desirable  Optimal    High     High     Very High     0-19 Y     0 - 109     ---    110-129   >/= 130     ----    20-24 Y     0 - 119     ---    120-159   >/= 160     ----      >24 Y     0 -  99   100-129  130-159   160-189     >/=190      VLDL 11/15/2024 26  0 - 40 mg/dL Final    Triglycerides 11/15/2024 131  0 - 149 mg/dL Final    Age              Desirable        Borderline         High        Very High  SEX:B           mg/dL             mg/dL               mg/dL      mg/dL  <=14D                       ----               ----        ----  15D-365D                    ----               ----        ----  1Y-9Y           0-74               75-99             >=100       ----  10Y-19Y        0-89                            >=130       ----  20Y-24Y        0-114              115-149             >=150      ----  >= 25Y         0-149             150-199             200-499    >=500      Venipuncture immediately after or during the administration of Metamizole may lead to falsely low results. Testing should be performed immediately prior to Metamizole dosing.    Non HDL Cholesterol 11/15/2024 161 (H)  0 - 149 mg/dL Final          Age       Desirable   Borderline High   High     Very High     0-19 Y     0 - 119       120 - 144     >/= 145    >/= 160    20-24 Y     0 - 149       150 - 189     >/= 190      ----         >24 Y    30 mg/dL above LDL Cholesterol goal      Glucose 11/15/2024 97  74 - 99 mg/dL Final    Sodium 11/15/2024 140  136 - 145 mmol/L Final    Potassium 11/15/2024 4.7  3.5 - 5.3 mmol/L Final    Chloride 11/15/2024 106  98 - 107 mmol/L Final    Bicarbonate 11/15/2024 27  21 - 32 mmol/L Final    Anion Gap 11/15/2024 12  10 - 20 mmol/L Final    Urea Nitrogen 11/15/2024 13  6 - 23 mg/dL Final    Creatinine 11/15/2024 0.90  0.50 - 1.30 mg/dL Final    eGFR 11/15/2024 >90  >60 mL/min/1.73m*2 Final    Calculations of estimated GFR are performed using the 2021 CKD-EPI Study Refit equation without the race variable for the IDMS-Traceable creatinine methods.  https://jasn.asnjournals.org/content/early/2021/09/22/ASN.5604981125    Calcium 11/15/2024 8.8  8.6 - 10.3 mg/dL Final    Albumin 11/15/2024 4.2  3.4 - 5.0 g/dL Final    Alkaline Phosphatase 11/15/2024 56  33 - 120 U/L Final    Total Protein 11/15/2024 6.7  6.4 - 8.2 g/dL Final    AST 11/15/2024 26  9 - 39 U/L Final    Bilirubin, Total 11/15/2024 0.5  0.0 - 1.2 mg/dL Final    ALT 11/15/2024 41  10 - 52 U/L Final    Patients treated with Sulfasalazine may generate falsely decreased results for ALT.    WBC 11/15/2024 3.7 (L)  4.4 - 11.3 x10*3/uL Final    nRBC 11/15/2024 0.0  0.0 - 0.0 /100 WBCs Final    RBC 11/15/2024 4.90  4.50 - 5.90 x10*6/uL Final    Hemoglobin 11/15/2024 15.1  13.5 - 17.5 g/dL Final    Hematocrit 11/15/2024 46.4   41.0 - 52.0 % Final    MCV 11/15/2024 95  80 - 100 fL Final    MCH 11/15/2024 30.8  26.0 - 34.0 pg Final    MCHC 11/15/2024 32.5  32.0 - 36.0 g/dL Final    RDW 11/15/2024 13.5  11.5 - 14.5 % Final    Platelets 11/15/2024 207  150 - 450 x10*3/uL Final    Neutrophils % 11/15/2024 55.8  40.0 - 80.0 % Final    Immature Granulocytes %, Automated 11/15/2024 0.3  0.0 - 0.9 % Final    Immature Granulocyte Count (IG) includes promyelocytes, myelocytes and metamyelocytes but does not include bands. Percent differential counts (%) should be interpreted in the context of the absolute cell counts (cells/UL).    Lymphocytes % 11/15/2024 27.8  13.0 - 44.0 % Final    Monocytes % 11/15/2024 10.2  2.0 - 10.0 % Final    Eosinophils % 11/15/2024 4.8  0.0 - 6.0 % Final    Basophils % 11/15/2024 1.1  0.0 - 2.0 % Final    Neutrophils Absolute 11/15/2024 2.09  1.20 - 7.70 x10*3/uL Final    Percent differential counts (%) should be interpreted in the context of the absolute cell counts (cells/uL).    Immature Granulocytes Absolute, Au* 11/15/2024 0.01  0.00 - 0.70 x10*3/uL Final    Lymphocytes Absolute 11/15/2024 1.04 (L)  1.20 - 4.80 x10*3/uL Final    Monocytes Absolute 11/15/2024 0.38  0.10 - 1.00 x10*3/uL Final    Eosinophils Absolute 11/15/2024 0.18  0.00 - 0.70 x10*3/uL Final    Basophils Absolute 11/15/2024 0.04  0.00 - 0.10 x10*3/uL Final    Hemoglobin A1C 11/15/2024 6.1 (H)  See comment % Final    Estimated Average Glucose 11/15/2024 128  Not Established mg/dL Final    HIV 1/2 Antigen/Antibody Screen wi* 11/15/2024 Nonreactive  Nonreactive Final    Hepatitis C AB 11/15/2024 Nonreactive  Nonreactive Final    Results from patients taking biotin supplements or receiving high-dose biotin therapy should be interpreted with caution due to possible interference with this test. Providers may contact their local laboratory for further information.       Physical Exam  CONSTITUTIONAL - well nourished, well developed, looks like stated age,  in no acute distress, not ill-appearing, and not tired appearing  SKIN - normal skin color and pigmentation, normal skin turgor without rash, lesions, or nodules visualized  HEAD - no trauma, normocephalic  EYES - normal external exam  CHEST -no distressed breathing, good effort  EXTREMITIES - no edema, no deformities  NEUROLOGICAL - normal balance, normal motor, no ataxia  PSYCHIATRIC - alert, pleasant and cordial, age-appropriate      Assessment/Plan   ADHD  Stable, well-controlled on current regimen of Adderall 15 mg twice daily  UDS/CSA up to date   Refills have been sent to your pharmacy for one month  Please follow up in 1 month for continued care and refills       If you have any questions/concerns, please contact our office.   Otherwise, we will see you at your next visit.

## 2025-01-27 ENCOUNTER — OFFICE VISIT (OUTPATIENT)
Dept: PRIMARY CARE | Facility: CLINIC | Age: 45
End: 2025-01-27
Payer: COMMERCIAL

## 2025-01-27 VITALS
SYSTOLIC BLOOD PRESSURE: 130 MMHG | WEIGHT: 231.8 LBS | HEART RATE: 110 BPM | DIASTOLIC BLOOD PRESSURE: 80 MMHG | BODY MASS INDEX: 32.33 KG/M2 | OXYGEN SATURATION: 98 %

## 2025-01-27 DIAGNOSIS — R03.0 ELEVATED BLOOD PRESSURE READING: ICD-10-CM

## 2025-01-27 DIAGNOSIS — F90.9 ATTENTION DEFICIT HYPERACTIVITY DISORDER (ADHD), UNSPECIFIED ADHD TYPE: ICD-10-CM

## 2025-01-27 PROCEDURE — 99214 OFFICE O/P EST MOD 30 MIN: CPT | Performed by: STUDENT IN AN ORGANIZED HEALTH CARE EDUCATION/TRAINING PROGRAM

## 2025-01-27 RX ORDER — DEXTROAMPHETAMINE SACCHARATE, AMPHETAMINE ASPARTATE, DEXTROAMPHETAMINE SULFATE AND AMPHETAMINE SULFATE 3.75; 3.75; 3.75; 3.75 MG/1; MG/1; MG/1; MG/1
15 TABLET ORAL 2 TIMES DAILY
Qty: 60 TABLET | Refills: 0 | Status: SHIPPED | OUTPATIENT
Start: 2025-02-26 | End: 2025-03-28

## 2025-01-27 RX ORDER — DEXTROAMPHETAMINE SACCHARATE, AMPHETAMINE ASPARTATE, DEXTROAMPHETAMINE SULFATE AND AMPHETAMINE SULFATE 3.75; 3.75; 3.75; 3.75 MG/1; MG/1; MG/1; MG/1
15 TABLET ORAL 2 TIMES DAILY
Qty: 60 TABLET | Refills: 0 | Status: SHIPPED | OUTPATIENT
Start: 2025-03-28 | End: 2025-04-27

## 2025-01-27 RX ORDER — DEXTROAMPHETAMINE SACCHARATE, AMPHETAMINE ASPARTATE, DEXTROAMPHETAMINE SULFATE AND AMPHETAMINE SULFATE 3.75; 3.75; 3.75; 3.75 MG/1; MG/1; MG/1; MG/1
15 TABLET ORAL 2 TIMES DAILY
Qty: 60 TABLET | Refills: 0 | Status: SHIPPED | OUTPATIENT
Start: 2025-01-27 | End: 2025-02-26

## 2025-01-27 RX ORDER — OLMESARTAN MEDOXOMIL 40 MG/1
40 TABLET ORAL DAILY
Qty: 90 TABLET | Refills: 1 | Status: SHIPPED | OUTPATIENT
Start: 2025-01-27

## 2025-01-27 NOTE — PROGRESS NOTES
Subjective   Patient ID: Esequiel Gonzalez is a 44 y.o. male who presents for ADHD.    HPI     1.ADHD  Continues to take Adderall 15 mg twice daily as needed in the chart  UDS/CSA needs to be updated today, willing to do so  Feels well without any major issues/complaints  Feels that his focus continues to remain stable on this current medication regimen  Requesting refills to be sent to his local pharmacy    2.  Elevated blood pressure/hypertension  Continues take olmesartan 40 mg daily  Blood pressure within normal limits  Denies any cardiopulmonary symptoms and feels well     Review of Systems  All pertinent positive symptoms are included in the history of present illness.    All other systems have been reviewed and are negative and noncontributory to this patient's current ailments.    Objective   /80 (BP Location: Left arm, Patient Position: Sitting, BP Cuff Size: Adult)   Pulse 110   Wt 105 kg (231 lb 12.8 oz)   SpO2 98%   BMI 32.33 kg/m²     Physical Exam  CONSTITUTIONAL - well nourished, well developed, looks like stated age, in no acute distress, not ill-appearing, and not tired appearing  SKIN - normal skin color and pigmentation, normal skin turgor without rash, lesions, or nodules visualized  HEAD - no trauma, normocephalic  EYES - normal external exam  CHEST -no distressed breathing, good effort, heart regular rate and rhythm, no murmurs, rubs, or gallops, lungs clear to auscultation bilaterally  EXTREMITIES - no edema, no deformities  NEUROLOGICAL - normal balance, normal motor, no ataxia  PSYCHIATRIC - alert, pleasant and cordial, age-appropriate    Assessment/Plan     1.ADHD  UDS/CSA up-to-date after today's visit  Refills have been sent to your pharmacy today   Continue Adderall 15 mg twice daily  Please follow-up in 3 months for continued care and refills, April/May 2025    2.  Hypertension  Stable without any changes recommended  Med refill sent to your pharmacy  I would like to have you  monitor and record blood pressures at home  Blood pressure goal should be below 130/80, ideally 120/80

## 2025-01-31 LAB
AMPHET UR-MCNC: 4111 NG/ML
AMPHET UR-MCNC: >4000 NG/ML
AMPHETAMINES UR QL: POSITIVE NG/ML
BARBITURATES UR QL: NEGATIVE NG/ML
BENZODIAZ UR QL: NEGATIVE NG/ML
BZE UR QL: NEGATIVE NG/ML
CREAT UR-MCNC: 131.8 MG/DL
DRUG SCREEN COMMENT UR-IMP: ABNORMAL
MDA UR-MCNC: NEGATIVE NG/ML
MDEA UR-MCNC: NEGATIVE NG/ML
MDMA UR-MCNC: NEGATIVE NG/ML
METHADONE UR QL: NEGATIVE NG/ML
METHAMPHET UR-MCNC: NEGATIVE NG/ML
METHAMPHET UR-MCNC: NEGATIVE NG/ML
OPIATES UR QL: NEGATIVE NG/ML
OXIDANTS UR QL: NEGATIVE MCG/ML
OXYCODONE UR QL: NEGATIVE NG/ML
PCP UR QL: NEGATIVE NG/ML
PH UR: 5.8 [PH] (ref 4.5–9)
PHENTERMINE UR-MCNC: NEGATIVE NG/ML
QUEST NOTES AND COMMENTS: ABNORMAL
THC UR QL: NEGATIVE NG/ML

## 2025-05-01 NOTE — PROGRESS NOTES
Subjective   Patient ID: Esequiel Gonzalez is a 44 y.o. male who presents for Med refill.  Today he is accompanied by .     Virtual or Telephone Consent    An interactive audio and video telecommunication system which permits real time communications between the patient (at the originating site) and provider (at the distant site) was utilized to provide this telehealth service.   Verbal consent was requested and obtained from Esequiel Gonzalez on this date, 05/02/25 for a telehealth visit and the patient's location was confirmed at the time of the visit.     HPI  1.ADHD  Continues to take Adderall 15 mg twice daily as needed in the chart  UDS/CSA updated on 01/2025  Feels well without any major issues/complaints  Feels that his focus continues to remain stable on this current medication regimen  Requesting refills to be sent to his local pharmacy    2.  Hypertension  Current antihypertensive regimen is olmesartan 40mg.   Denies any chest pain or shortness of breath   Requesting refill      Medications Ordered Prior to Encounter[1]     Allergies[2]    Immunization History   Administered Date(s) Administered    Tdap vaccine, age 7 year and older (BOOSTRIX, ADACEL) 05/02/2014, 06/08/2021       Review of Systems  All pertinent positive symptoms are included in the history of present illness.    All other systems have been reviewed and are negative and noncontributory to this patient's current ailments.     Objective   There were no vitals taken for this visit.  BSA: There is no height or weight on file to calculate BSA.  Growth percentiles: Facility age limit for growth %mercy is 20 years. Facility age limit for growth %mrecy is 20 years.   No visits with results within 1 Month(s) from this visit.   Latest known visit with results is:   Office Visit on 01/27/2025   Component Date Value Ref Range Status    Amphetamines 01/27/2025 POSITIVE (A)  <500 ng/mL Final    Amphetamine 01/27/2025 4,111 (H)  <250 ng/mL Final    Methamphetamine  01/27/2025 NEGATIVE  <250 ng/mL Final    Amphetamines Comments 01/27/2025    Final    See Amphetamines Notes, LDT Notes    Barbiturates 01/27/2025 NEGATIVE  <300 ng/mL Final    Benzodiazepines 01/27/2025 NEGATIVE  <100 ng/mL Final    Cocaine Metabolite 01/27/2025 NEGATIVE  <150 ng/mL Final    Marijuana Metabolite 01/27/2025 NEGATIVE  <20 ng/mL Final    Methadone Metabolite 01/27/2025 NEGATIVE  <100 ng/mL Final    Opiates 01/27/2025 NEGATIVE  <100 ng/mL Final    Oxycodone 01/27/2025 NEGATIVE  <100 ng/mL Final    Phencyclidine 01/27/2025 NEGATIVE  <25 ng/mL Final    Creatinine 01/27/2025 131.8  > or = 20.0 mg/dL Final    pH 01/27/2025 5.8  4.5 - 9.0 Final    Oxidant 01/27/2025 NEGATIVE  <200 mcg/mL Final    Notes and Comments 01/27/2025    Final    Comment: This drug testing is for medical treatment only.  Analysis was performed as non-forensic testing and  these results should be used only by healthcare  providers to render diagnosis or treatment, or to  monitor progress of medical conditions.     Amphetamines Notes:  Amphetamine detected is consistent with the use of the   drug Amphetamine.     Amphetamine can be a prescribed drug and is also a   metabolite of methamphetamine.     LDT Notes:  Confirmation tests were developed and their analytical   performance characteristics have been determined by   [facility] [employer] [facility]. It has not been cleared or approved   by the FDA. This assay has been validated pursuant to   the CLIA regulations and is used for clinical purposes.        Healthcare Providers needing Interpretation assistance,   please contact us at [phone] ([phone])   M-F, 8am to 10pm EST      AMPHETAMINE 01/27/2025 >4000 (H)  <200 ng/mL Final    Comment:    This test was developed and its analytical performance   characteristics have been determined by [facility]. It has not been cleared or approved by the  FDA. This assay has been validated pursuant to the CLIA   regulations and is used  for clinical purposes.         METHAMPHETAMINE 01/27/2025 NEGATIVE  <200 ng/mL Final    Comment:    This test was developed and its analytical performance   characteristics have been determined by [facility]. It has not been cleared or approved by the  FDA. This assay has been validated pursuant to the CLIA   regulations and is used for clinical purposes.         PHENTERMINE 01/27/2025 NEGATIVE  <200 ng/mL Final    Comment:    This test was developed and its analytical performance   characteristics have been determined by [facility]. It has not been cleared or approved by the  FDA. This assay has been validated pursuant to the CLIA   regulations and is used for clinical purposes.         MDA 01/27/2025 NEGATIVE  <200 ng/mL Final    Comment:    This test was developed and its analytical performance   characteristics have been determined by [facility]. It has not been cleared or approved by the  FDA. This assay has been validated pursuant to the CLIA   regulations and is used for clinical purposes.         MDMA 01/27/2025 NEGATIVE  <200 ng/mL Final    Comment:    This test was developed and its analytical performance   characteristics have been determined by [facility]. It has not been cleared or approved by the  FDA. This assay has been validated pursuant to the CLIA   regulations and is used for clinical purposes.         MDEA 01/27/2025 NEGATIVE  <200 ng/mL Final    Comment:    This test was developed and its analytical performance   characteristics have been determined by [employer] [facility]. It has not been cleared or approved by the  FDA. This assay has been validated pursuant to the CLIA   regulations and is used for clinical purposes.     This drug testing is for medical treatment only.    Analysis was performed as non-forensic testing and   these results should be used only by healthcare   providers to render diagnosis or treatment, or to   monitor progress of medical  conditions.         Physical Exam    CONSTITUTIONAL - well nourished, well developed, looks like stated age and in no distress  SKIN - no rash, no lesions, warm to touch and normal turgor  HEAD - no trauma, normocephalic  EYE - PERRL and EOMI with normal external exam  NECK - supple, no rigidity and no thyromegaly  CHEST - clear to auscultation without wheezing, crackles or rales  CARDIAC - normal heart sounds and physiologic rhythm without murmurs  NEUROLOGICAL - normal gait, normal balance, normal motor, no ataxia, alert, oriented,   PSYCHIATRIC - alert, oriented, pleasant and cordial  IMMUNOLOGIC - no cervical lymphadenopathy    Assessment/Plan   1.ADHD  Continues to take Adderall 15 mg twice daily as needed in the chart  UDS/CSA updated on 01/2025  Feels well without any major issues/complaints  Feels that his focus continues to remain stable on this current medication regimen  Requesting refills to be sent to his local pharmacy    2.  Hypertension  Stable without any changes recommended  Med refill sent to your pharmacy  I would like to have you monitor and record blood pressures at home  Blood pressure goal should be below 130/80, ideally 120/80    Any questions or concerns, please feel free to send me a message or give me a call.   I will see you in 1 months for a follow up or sooner if needed.         [1]   Current Outpatient Medications on File Prior to Visit   Medication Sig Dispense Refill    amphetamine-dextroamphetamine (Adderall) 15 mg tablet Take 1 tablet (15 mg) by mouth 2 times a day. Do not fill before February 26, 2025. 60 tablet 0    amphetamine-dextroamphetamine (Adderall) 15 mg tablet Take 1 tablet (15 mg) by mouth 2 times a day. Do not fill before March 28, 2025. 60 tablet 0    amphetamine-dextroamphetamine (Adderall) 15 mg tablet Take 1 tablet (15 mg) by mouth 2 times a day. 60 tablet 0    olmesartan (BENIcar) 40 mg tablet Take 1 tablet (40 mg) by mouth once daily. 90 tablet 1     No current  facility-administered medications on file prior to visit.   [2] No Known Allergies

## 2025-05-02 ENCOUNTER — TELEMEDICINE (OUTPATIENT)
Dept: PRIMARY CARE | Facility: CLINIC | Age: 45
End: 2025-05-02
Payer: COMMERCIAL

## 2025-05-02 DIAGNOSIS — R03.0 ELEVATED BLOOD PRESSURE READING: ICD-10-CM

## 2025-05-02 DIAGNOSIS — F90.9 ATTENTION DEFICIT HYPERACTIVITY DISORDER (ADHD), UNSPECIFIED ADHD TYPE: ICD-10-CM

## 2025-05-02 PROCEDURE — 99214 OFFICE O/P EST MOD 30 MIN: CPT | Performed by: STUDENT IN AN ORGANIZED HEALTH CARE EDUCATION/TRAINING PROGRAM

## 2025-05-02 RX ORDER — DEXTROAMPHETAMINE SACCHARATE, AMPHETAMINE ASPARTATE, DEXTROAMPHETAMINE SULFATE AND AMPHETAMINE SULFATE 3.75; 3.75; 3.75; 3.75 MG/1; MG/1; MG/1; MG/1
15 TABLET ORAL 2 TIMES DAILY
Qty: 60 TABLET | Refills: 0 | Status: SHIPPED | OUTPATIENT
Start: 2025-05-02 | End: 2025-06-01

## 2025-05-02 RX ORDER — OLMESARTAN MEDOXOMIL 40 MG/1
40 TABLET ORAL DAILY
Qty: 90 TABLET | Refills: 1 | Status: SHIPPED | OUTPATIENT
Start: 2025-05-02

## 2025-05-28 DIAGNOSIS — F90.9 ATTENTION DEFICIT HYPERACTIVITY DISORDER (ADHD), UNSPECIFIED ADHD TYPE: ICD-10-CM

## 2025-05-29 RX ORDER — DEXTROAMPHETAMINE SACCHARATE, AMPHETAMINE ASPARTATE, DEXTROAMPHETAMINE SULFATE AND AMPHETAMINE SULFATE 3.75; 3.75; 3.75; 3.75 MG/1; MG/1; MG/1; MG/1
15 TABLET ORAL 2 TIMES DAILY
Qty: 14 TABLET | Refills: 0 | Status: SHIPPED | OUTPATIENT
Start: 2025-05-29 | End: 2025-06-05

## 2025-06-09 ENCOUNTER — APPOINTMENT (OUTPATIENT)
Dept: PRIMARY CARE | Facility: CLINIC | Age: 45
End: 2025-06-09
Payer: COMMERCIAL

## 2025-06-09 VITALS
SYSTOLIC BLOOD PRESSURE: 130 MMHG | OXYGEN SATURATION: 96 % | BODY MASS INDEX: 31.38 KG/M2 | HEART RATE: 110 BPM | DIASTOLIC BLOOD PRESSURE: 80 MMHG | WEIGHT: 225 LBS

## 2025-06-09 DIAGNOSIS — I10 PRIMARY HYPERTENSION: ICD-10-CM

## 2025-06-09 DIAGNOSIS — F90.9 ATTENTION DEFICIT HYPERACTIVITY DISORDER (ADHD), UNSPECIFIED ADHD TYPE: Primary | ICD-10-CM

## 2025-06-09 DIAGNOSIS — G56.03 BILATERAL CARPAL TUNNEL SYNDROME: ICD-10-CM

## 2025-06-09 DIAGNOSIS — R03.0 ELEVATED BLOOD PRESSURE READING: ICD-10-CM

## 2025-06-09 PROCEDURE — 3075F SYST BP GE 130 - 139MM HG: CPT | Performed by: STUDENT IN AN ORGANIZED HEALTH CARE EDUCATION/TRAINING PROGRAM

## 2025-06-09 PROCEDURE — 3079F DIAST BP 80-89 MM HG: CPT | Performed by: STUDENT IN AN ORGANIZED HEALTH CARE EDUCATION/TRAINING PROGRAM

## 2025-06-09 PROCEDURE — 99214 OFFICE O/P EST MOD 30 MIN: CPT | Performed by: STUDENT IN AN ORGANIZED HEALTH CARE EDUCATION/TRAINING PROGRAM

## 2025-06-09 RX ORDER — DEXTROAMPHETAMINE SACCHARATE, AMPHETAMINE ASPARTATE, DEXTROAMPHETAMINE SULFATE AND AMPHETAMINE SULFATE 3.75; 3.75; 3.75; 3.75 MG/1; MG/1; MG/1; MG/1
15 TABLET ORAL 2 TIMES DAILY
Qty: 60 TABLET | Refills: 0 | Status: SHIPPED | OUTPATIENT
Start: 2025-06-09 | End: 2025-07-09

## 2025-06-09 RX ORDER — DEXTROAMPHETAMINE SACCHARATE, AMPHETAMINE ASPARTATE, DEXTROAMPHETAMINE SULFATE AND AMPHETAMINE SULFATE 3.75; 3.75; 3.75; 3.75 MG/1; MG/1; MG/1; MG/1
15 TABLET ORAL 2 TIMES DAILY
Qty: 60 TABLET | Refills: 0 | Status: SHIPPED | OUTPATIENT
Start: 2025-07-09 | End: 2025-08-08

## 2025-06-09 RX ORDER — DEXTROAMPHETAMINE SACCHARATE, AMPHETAMINE ASPARTATE, DEXTROAMPHETAMINE SULFATE AND AMPHETAMINE SULFATE 3.75; 3.75; 3.75; 3.75 MG/1; MG/1; MG/1; MG/1
15 TABLET ORAL 2 TIMES DAILY
Qty: 60 TABLET | Refills: 0 | Status: SHIPPED | OUTPATIENT
Start: 2025-08-08 | End: 2025-09-07

## 2025-06-09 RX ORDER — OLMESARTAN MEDOXOMIL 40 MG/1
40 TABLET ORAL DAILY
Qty: 90 TABLET | Refills: 1 | Status: SHIPPED | OUTPATIENT
Start: 2025-06-09

## 2025-06-09 NOTE — PROGRESS NOTES
Subjective   Patient ID: Esequiel Gonzalez is a 44 y.o. male who presents for ADHD and Hypertension.    HPI     1.ADHD  Continues to take Adderall 15 mg twice daily as needed in the chart  UDS/CSA updated on 01/2025  Feels well without any major issues/complaints  Feels that his focus continues to remain stable on this current medication regimen  Requesting refills to be sent to his local pharmacy    2.  Hypertension  Current antihypertensive regimen is olmesartan 40mg.   Blood pressure at 132/84 at triage upon repeat 130/80  Took preworkout prior to the visit  Denies any cardiopulmonary symptoms and feels well     3. Carpal Tunnel   States that he has been having some numbness and tingling bilaterally into the first 3 digits of his fingers.  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    Review of Systems  All pertinent positive symptoms are included in the history of present illness.    All other systems have been reviewed and are negative and noncontributory to this patient's current ailments.    Objective   /84 (BP Location: Left arm, Patient Position: Sitting, BP Cuff Size: Adult)   Pulse 110   Wt 102 kg (225 lb)   SpO2 96%   BMI 31.38 kg/m²     Physical Exam  CONSTITUTIONAL - well nourished, well developed, looks like stated age, in no acute distress, not ill-appearing, and not tired appearing  SKIN - normal skin color and pigmentation, normal skin turgor without rash, lesions, or nodules visualized  HEAD - no trauma, normocephalic  EYES - normal external exam  CHEST -no distressed breathing, good effort, heart regular rate and rhythm, no murmurs, rubs, or gallops, lungs clear to auscultation bilaterally  EXTREMITIES - no edema, no deformities  NEUROLOGICAL - normal balance, normal motor, no ataxia  PSYCHIATRIC - alert, pleasant and cordial, age-appropriate    Assessment/Plan      1.ADHD  UDS/CSA up-to-date 1/2024  Refills have been sent to your pharmacy today   Continue Adderall 15 mg twice daily  Please follow-up in 3 months for continued care and refills    2.  Hypertension  Stable without any changes recommended  I would like to have you monitor and record blood pressures at home  Blood pressure goal should be below 130/80, ideally 120/80    3.  Carpal tunnel  Discussed carpal tunnel with patient at length today  We recommend using a brace at night to help with her symptoms.  In addition please let us know if your symptoms do not improve or continue to worsen we will consider x-rays as well as a hand surgeon referral at that time    We will continue to follow-up every 3 months or sooner if needed.

## 2025-07-02 ENCOUNTER — OFFICE VISIT (OUTPATIENT)
Dept: PRIMARY CARE | Facility: CLINIC | Age: 45
End: 2025-07-02
Payer: COMMERCIAL

## 2025-07-02 VITALS
OXYGEN SATURATION: 98 % | BODY MASS INDEX: 32.08 KG/M2 | DIASTOLIC BLOOD PRESSURE: 84 MMHG | SYSTOLIC BLOOD PRESSURE: 130 MMHG | HEART RATE: 98 BPM | WEIGHT: 230 LBS

## 2025-07-02 DIAGNOSIS — M25.531 PAIN IN BOTH WRISTS: Primary | ICD-10-CM

## 2025-07-02 DIAGNOSIS — M25.532 PAIN IN BOTH WRISTS: Primary | ICD-10-CM

## 2025-07-02 DIAGNOSIS — S66.812A STRAIN OF METACARPOPHALANGEAL JOINT OF LEFT HAND, INITIAL ENCOUNTER: ICD-10-CM

## 2025-07-02 PROCEDURE — 99214 OFFICE O/P EST MOD 30 MIN: CPT | Performed by: STUDENT IN AN ORGANIZED HEALTH CARE EDUCATION/TRAINING PROGRAM

## 2025-07-02 RX ORDER — PREDNISONE 10 MG/1
TABLET ORAL
Qty: 30 TABLET | Refills: 0 | Status: SHIPPED | OUTPATIENT
Start: 2025-07-02

## 2025-07-02 NOTE — PROGRESS NOTES
Subjective   Patient ID: Esequiel Gonzalez is a 45 y.o. male who presents for Carpal Tunnel.    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    All other systems have been reviewed and are negative and noncontributory to this patient's current ailments.    Objective   /84 (BP Location: Left arm, Patient Position: Sitting, BP Cuff Size: Large adult)   Pulse 98   Wt 104 kg (230 lb)   SpO2 98%   BMI 32.08 kg/m²     Physical Exam  CONSTITUTIONAL - well nourished, well developed, looks like stated age, in no acute distress, not ill-appearing, and not tired appearing  SKIN - normal skin color and pigmentation, normal skin turgor without rash, lesions, or nodules visualized  HEAD - no trauma, normocephalic  EYES - normal external exam  CHEST -no distressed breathing, good effort  EXTREMITIES - no edema, no deformities, Tinel's and Phalen's negative, noted pain within the MCP joint with some slight swelling  NEUROLOGICAL - normal balance, normal motor, no ataxia  PSYCHIATRIC - alert, pleasant and cordial, age-appropriate    Assessment/Plan   Ultimately we had a long conversation that your differential could be related to carpal tunnel, de Quervain's, MCP joint pain, or even a gout flare    Since this differential is very broad  this is an unknown diagnosed problem with an uncertain prognosis which is why we need to continue following closely and be slightly more aggressive    I did provide a 12-day taper of prednisone to help with both pain as well as swelling    Lastly I did place a requisition to follow-up with a hand surgeon at your earliest mutual convenience

## 2025-07-18 DIAGNOSIS — M19.032 CMC DJD(CARPOMETACARPAL DEGENERATIVE JOINT DISEASE), LOCALIZED PRIMARY, LEFT: ICD-10-CM

## 2025-07-18 DIAGNOSIS — M19.031 CMC DJD(CARPOMETACARPAL DEGENERATIVE JOINT DISEASE), LOCALIZED PRIMARY, RIGHT: ICD-10-CM

## 2025-07-23 ENCOUNTER — HOSPITAL ENCOUNTER (OUTPATIENT)
Dept: RADIOLOGY | Facility: CLINIC | Age: 45
Discharge: HOME | End: 2025-07-23
Payer: COMMERCIAL

## 2025-07-23 ENCOUNTER — APPOINTMENT (OUTPATIENT)
Dept: ORTHOPEDIC SURGERY | Facility: CLINIC | Age: 45
End: 2025-07-23
Payer: COMMERCIAL

## 2025-07-23 VITALS — WEIGHT: 230 LBS | HEIGHT: 71 IN | BODY MASS INDEX: 32.2 KG/M2

## 2025-07-23 DIAGNOSIS — M19.032 CMC DJD(CARPOMETACARPAL DEGENERATIVE JOINT DISEASE), LOCALIZED PRIMARY, LEFT: ICD-10-CM

## 2025-07-23 DIAGNOSIS — G56.01 CARPAL TUNNEL SYNDROME OF RIGHT WRIST: ICD-10-CM

## 2025-07-23 DIAGNOSIS — G56.03 BILATERAL CARPAL TUNNEL SYNDROME: ICD-10-CM

## 2025-07-23 DIAGNOSIS — G56.02 CARPAL TUNNEL SYNDROME OF LEFT WRIST: Primary | ICD-10-CM

## 2025-07-23 DIAGNOSIS — M19.031 CMC DJD(CARPOMETACARPAL DEGENERATIVE JOINT DISEASE), LOCALIZED PRIMARY, RIGHT: ICD-10-CM

## 2025-07-23 PROCEDURE — 20600 DRAIN/INJ JOINT/BURSA W/O US: CPT | Performed by: ORTHOPAEDIC SURGERY

## 2025-07-23 PROCEDURE — 73110 X-RAY EXAM OF WRIST: CPT | Mod: BILATERAL PROCEDURE | Performed by: STUDENT IN AN ORGANIZED HEALTH CARE EDUCATION/TRAINING PROGRAM

## 2025-07-23 PROCEDURE — 73110 X-RAY EXAM OF WRIST: CPT | Mod: 50

## 2025-07-23 PROCEDURE — 99204 OFFICE O/P NEW MOD 45 MIN: CPT | Performed by: ORTHOPAEDIC SURGERY

## 2025-07-23 PROCEDURE — 3008F BODY MASS INDEX DOCD: CPT | Performed by: ORTHOPAEDIC SURGERY

## 2025-07-23 RX ORDER — LIDOCAINE HYDROCHLORIDE 10 MG/ML
1 INJECTION, SOLUTION INFILTRATION; PERINEURAL
Status: COMPLETED | OUTPATIENT
Start: 2025-07-23 | End: 2025-07-23

## 2025-07-23 RX ORDER — TRIAMCINOLONE ACETONIDE 40 MG/ML
40 INJECTION, SUSPENSION INTRA-ARTICULAR; INTRAMUSCULAR
Status: COMPLETED | OUTPATIENT
Start: 2025-07-23 | End: 2025-07-23

## 2025-07-23 RX ADMIN — TRIAMCINOLONE ACETONIDE 40 MG: 40 INJECTION, SUSPENSION INTRA-ARTICULAR; INTRAMUSCULAR at 15:21

## 2025-07-23 RX ADMIN — LIDOCAINE HYDROCHLORIDE 1 ML: 10 INJECTION, SOLUTION INFILTRATION; PERINEURAL at 15:21

## 2025-07-23 NOTE — PROGRESS NOTES
Bilateral wrist pain  Bilateral numbness in thumb, index, and middle finger.  Pain for the last few months  Has used a wrist brace with some relief

## 2025-07-23 NOTE — PROGRESS NOTES
45 y.o. male presents today for evaluation of bilateral hand numbness, tingling, weakness and pain. The patient reports symptoms for months, getting worse.  Pain is controlled.  Reports no previous surgeries, injections or trauma to the area.  Reports pain worse with use, better at rest.  Pain numb and tingly, wakes them from sleep.   Worse driving a car.   Also has bilateral base of thumb pain.  Worse opening bottles and jars.    Review of Systems    Constitutional: see HPI, no fever, no chills, not feeling tired, no significant weight gain or weight loss.   Eyes: No vision changes  ENT: no nosebleeds.   Cardiovascular: no chest pain.   Respiratory: no shortness of breath and no cough.   Gastrointestinal: no abdominal pain, no nausea, no vomiting and no diarrhea.   Musculoskeletal: per HPI  Neurological: no headache, no gait disturbances  Psychiatric: no depression and no sleep disturbances.   Endocrine: no muscle weakness and no muscle cramps.   Hematologic/Lymphatic: no swollen glands and no tendency for easy bruising or excessive swelling.     Patient's past medical history, past surgical history, allergies, and medications have been reviewed unless otherwise noted in the chart.     CMC Arthritis Exam  Inspection:  no evidence of infection, no edema, no erythema, no ecchymosis, Palpation:  compartments are soft, pain with palpation over the Thumb CMC joint, Range of Motion:  full wrist and finger range of motion, Stability:  no wrist or finger instability detected, Strength:  5/5  and pinch strength, Skin:  intact, Vascular:  capillary refill <2 seconds distally, Sensation:  intact to light touch distally, Tests:  negative Finkelstein's , positive Thumb CMC Grind.      Carpal Tunnel Exam  Inspection:  no evidence of infection, no edema, no erythema, no ecchymosis, Palpation:  compartments are soft, no pain with palpation, Range of Motion:  full wrist and finger range of motion, Stability:  no wrist  instability detected, Strength:  5/5 APB and intrinsics, Skin:  intact, Vascular:  capillary refill <2 seconds distally, Sensation:  decreased in the median nerve distribution, Test:  positive Tinel's at the Carpal Tunnel, positive Direct Carpal Tunnel Compression Test      Constitutional   General appearance: Alert and in no acute distress. Well developed, well nourished.    Eyes   External Eye, Conjunctiva and lids: Normal external exam - pupils were equal in size, round, reactive to light (PERRL) with normal accommodation and extraocular movements intact (EOMI).   Ears, Nose, Mouth, and Throat   Hearing: Normal.   Neck   Neck: No neck mass was observed. Supple.   Pulmonary   Respiratory effort: No respiratory distress.   Cardiovascular   Examination of extremities: No peripheral edema.   Psychiatric   Judgment and insight: Intact.   Orientation to person, place, and time: Alert and oriented x 3.       Mood and affect: Normal.      XR - no fractures, no dislocations, bilateral thumb CMC DJD    X-rays were personally reviewed by me. Radiology reports were reviewed by me as well, if available at the time.      Bilateral carpal tunnel syndrome, bilateral thumb CMC DJD  Based on the history, physical exam and imaging studies above, the patient's presentation is consistent with the above diagnosis.  I had a long discussion with the patient regarding their presentation and the treatment options.  We discussed initial nonoperative versus operative management options as well as potential further diagnostic imaging.  We again discussed her treatment options going forward along with their associated risks and benefits. After thorough discussion, the patient has elected to proceed with conservative management. All questions were answered to the patients satisfaction who seems satisfied with the plan.  They will call the office with any questions/concerns.    Discussion I discussed the diagnosis and treatment options with the  patient today along with their associated risks and benefits. After thorough discussion, the patient has elected to proceed with a corticosteroid injection with Kenalog and Xylocaine, which was performed in the office today under aseptic technique and the patient tolerated the procedure well.          Ortho Injections:           Injection site thumb CMC. Medication 1 cc 1% Xylocaine, 1 cc 40 mg Kenalog, Injection given under sterile conditions. No immediate complications noted. Post injection instructions given.  Night splints  EMG  Comfort Cool as needed  Follow-up after EMG     Patient ID: Esequiel Gonzalez is a 45 y.o. male.    S Inj/Asp: bilateral thumb CMC on 7/23/2025 3:21 PM  Indications: pain  Details: 25 G needle (dorsoradial) approach  Medications (Right): 40 mg triamcinolone acetonide 40 mg/mL; 1 mL lidocaine 10 mg/mL (1 %)  Medications (Left): 40 mg triamcinolone acetonide 40 mg/mL; 1 mL lidocaine 10 mg/mL (1 %)  Outcome: tolerated well, no immediate complications    Prepped with alcohol. Patient tolerated injection well. Band-aid applied to injection site.     Procedure, treatment alternatives, risks and benefits explained, specific risks discussed. Consent was given by the patient. Immediately prior to procedure a time out was called to verify the correct patient, procedure, equipment, support staff and site/side marked as required. Patient was prepped and draped in the usual sterile fashion.